# Patient Record
Sex: FEMALE | Race: WHITE | NOT HISPANIC OR LATINO | Employment: OTHER | URBAN - METROPOLITAN AREA
[De-identification: names, ages, dates, MRNs, and addresses within clinical notes are randomized per-mention and may not be internally consistent; named-entity substitution may affect disease eponyms.]

---

## 2017-01-01 DIAGNOSIS — I10 ESSENTIAL HYPERTENSION: ICD-10-CM

## 2017-01-03 RX ORDER — LISINOPRIL 5 MG/1
TABLET ORAL
Qty: 30 TABLET | Refills: 5 | Status: SHIPPED | OUTPATIENT
Start: 2017-01-03 | End: 2017-06-26 | Stop reason: SDUPTHER

## 2017-03-06 RX ORDER — METOPROLOL TARTRATE 50 MG/1
TABLET, FILM COATED ORAL
Qty: 180 TABLET | Refills: 1 | Status: SHIPPED | OUTPATIENT
Start: 2017-03-06 | End: 2017-08-29 | Stop reason: SDUPTHER

## 2017-06-26 ENCOUNTER — OFFICE VISIT (OUTPATIENT)
Dept: CARDIOLOGY | Facility: CLINIC | Age: 62
End: 2017-06-26

## 2017-06-26 VITALS
HEIGHT: 66 IN | HEART RATE: 65 BPM | DIASTOLIC BLOOD PRESSURE: 82 MMHG | BODY MASS INDEX: 33.62 KG/M2 | SYSTOLIC BLOOD PRESSURE: 118 MMHG | WEIGHT: 209.2 LBS

## 2017-06-26 DIAGNOSIS — I10 ESSENTIAL HYPERTENSION: ICD-10-CM

## 2017-06-26 DIAGNOSIS — I48.0 PAF (PAROXYSMAL ATRIAL FIBRILLATION) (HCC): Primary | ICD-10-CM

## 2017-06-26 PROCEDURE — 93000 ELECTROCARDIOGRAM COMPLETE: CPT | Performed by: INTERNAL MEDICINE

## 2017-06-26 PROCEDURE — 99213 OFFICE O/P EST LOW 20 MIN: CPT | Performed by: INTERNAL MEDICINE

## 2017-06-26 RX ORDER — LISINOPRIL 5 MG/1
5 TABLET ORAL DAILY
Qty: 90 TABLET | Refills: 3 | Status: SHIPPED | OUTPATIENT
Start: 2017-06-26 | End: 2018-03-26 | Stop reason: SDUPTHER

## 2017-06-26 NOTE — PROGRESS NOTES
"Date of Office Visit: 2017  Encounter Provider: Dylan Stanton MD  Place of Service: [facility] CARDIOLOGY  Patient Name: Luiza Melendrez  :1955    Chief Complaint   Patient presents with   • Atrial Fibrillation     6 MONTHS FOLLOW UP   :     HPI: Luiza Melendrez is a 62 y.o. female who presents today to follow up.     She presented with atrial flutter in 2013 and underwent successful ablation. In 2016, she went to the ED with palpiations and was found to be in atrial fibrillation and quickly converted to sinus rhythm. She was seen in our office soon thereafter and had an echocardiogram which was normal except for mild-moderate left atrial dilation. She was placed on metoprolol, and was not initially anticoagulated due to her own concerns as well as the fact that she needed a colonscopy. Afterwards, she was started on rivaroxaban.       She has infrequent palpitations; they usually occur when she lies down at night. They're isolated and sound like PVC's or PAC's. She has not had sustained palpitations like her previous atrial fibrillation.  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       Past Medical History:   Diagnosis Date   • Atrial flutter 2013    s/p ablation 2013   • Bladder polyp    • Edema of lower extremity     left; chronic   • GERD (gastroesophageal reflux disease)    • Hypertension    • Loose, teeth     \"due to shorter than normal root length\"   • Obstructive sleep apnea     - wearing CPAP machine    • PAF (paroxysmal atrial fibrillation)    • Pain of cervical spine     INTERMITTENT   • PONV (postoperative nausea and vomiting)     PRIOR TO DIPROVAN USE       Past Surgical History:   Procedure Laterality Date   • BUNIONECTOMY     • CARDIAC ABLATION  2013    for atrial flutter   • COLONOSCOPY N/A 10/18/2016    Procedure: COLONOSCOPY " "into cecum and terminal ileum.  with polypectomy;  Surgeon: Eusebio Cartagena MD;  Location: [facility] ENDOSCOPY;  Service:    • HYSTERECTOMY  2012    WITH ANTERIOR REPAIR   • REPLACEMENT TOTAL KNEE Right 05/14/2013   • REPLACEMENT TOTAL KNEE Left 11/05/2013   • TUBAL ABDOMINAL LIGATION         Social History     Social History   • Marital status:      Spouse name: N/A   • Number of children: N/A   • Years of education: N/A     Occupational History   • Not on file.     Social History Main Topics   • Smoking status: Never Smoker   • Smokeless tobacco: Never Used      Comment: caffeine use / tea/ 6 CUPS DAILY    • Alcohol use Yes      Comment: glass of wine with dinner sometimes & bourbon on the weekends   • Drug use: No   • Sexual activity: Defer     Other Topics Concern   • Not on file     Social History Narrative       Family History   Problem Relation Age of Onset   • Atrial fibrillation Mother    • Cancer Father      bladder    • Valvular heart disease Father      aortic valve replacement       Review of Systems   Cardiovascular: Positive for leg swelling.   All other systems reviewed and are negative.      Allergies   Allergen Reactions   • Erythromycin Nausea And Vomiting   • Hydrocodone-Acetaminophen Nausea And Vomiting   • Morphine And Related Nausea And Vomiting   • Oxycodone-Acetaminophen Nausea And Vomiting         Current Outpatient Prescriptions:   •  lisinopril (PRINIVIL,ZESTRIL) 5 MG tablet, Take 1 tablet by mouth Daily., Disp: 90 tablet, Rfl: 3  •  metoprolol tartrate (LOPRESSOR) 50 MG tablet, TAKE ONE TABLET BY MOUTH TWICE A DAY, Disp: 180 tablet, Rfl: 1  •  pantoprazole (PROTONIX) 40 MG EC tablet, Take 40 mg by mouth Daily., Disp: , Rfl:   •  rivaroxaban (XARELTO) 20 MG tablet, Take 1 tablet by mouth Daily., Disp: 30 tablet, Rfl: 11     Objective:     Vitals:    06/26/17 1030   BP: 118/82   Pulse: 65   Weight: 209 lb 3.2 oz (94.9 kg)   Height: 66\" (167.6 cm)     Body mass index is 33.77 " kg/(m^2).    Physical Exam   Constitutional: She is oriented to person, place, and time.   Obese   HENT:   Head: Normocephalic.   Nose: Nose normal.   Mouth/Throat: Oropharynx is clear and moist.   Eyes: Conjunctivae and EOM are normal. Pupils are equal, round, and reactive to light.   Neck: Normal range of motion.   Cannot assess for JVD due to habitus   Cardiovascular: Normal rate, regular rhythm, normal heart sounds and intact distal pulses.    No murmur heard.  Pulmonary/Chest: Effort normal.   Abdominal: Soft.   Obesity limits abdominal exam   Musculoskeletal: Normal range of motion. She exhibits edema (mild nonpitting pedal swelling).   Neurological: She is alert and oriented to person, place, and time. No cranial nerve deficit.   Skin: Skin is warm and dry. No rash noted.   Psychiatric: She has a normal mood and affect. Her behavior is normal. Judgment and thought content normal.   Vitals reviewed.        ECG 12 Lead  Date/Time: 6/26/2017 12:55 PM  Performed by: GEOFFREY MUÑOZ  Authorized by: GEOFFREY MUÑOZ   Comparison: compared with previous ECG   Similar to previous ECG  Rhythm: sinus rhythm  Conduction: conduction normal  ST Segments: ST segments normal  T Waves: T waves normal  QRS axis: normal  Other: no other findings  Clinical impression: normal ECG              Assessment:       Diagnosis Plan   1. PAF (paroxysmal atrial fibrillation)     2. Essential hypertension  lisinopril (PRINIVIL,ZESTRIL) 5 MG tablet          Plan:       1.  Atrial Fibrillation and Atrial Flutter  Assessment  • The patient has paroxysmal atrial fibrillation  • This is non-valvular in etiology  • The patient's CHADS2-VASc score is 2  • A ZCO6EM4-SPIb score of 2 or more is considered a high risk for a thromboembolic event  • Rivaroxaban prescribed    Plan  • Attempt to maintain sinus rhythm  • Continue rivaroxaban for antithrombotic therapy, bleeding issues discussed  • Continue beta blocker for rhythm control  • If she develops  worsening symptoms in the future, I will likely use a IC AAD (will need a stress test first if it comes to that).    2.  Her BP is within goal.     Sincerely,       Dylan Stanton MD

## 2017-08-29 RX ORDER — METOPROLOL TARTRATE 50 MG/1
TABLET, FILM COATED ORAL
Qty: 180 TABLET | Refills: 1 | Status: SHIPPED | OUTPATIENT
Start: 2017-08-29 | End: 2018-03-02 | Stop reason: SDUPTHER

## 2017-12-06 RX ORDER — RIVAROXABAN 20 MG/1
TABLET, FILM COATED ORAL
Qty: 30 TABLET | Refills: 4 | Status: SHIPPED | OUTPATIENT
Start: 2017-12-06 | End: 2018-03-26 | Stop reason: SDUPTHER

## 2018-03-02 RX ORDER — METOPROLOL TARTRATE 50 MG/1
TABLET, FILM COATED ORAL
Qty: 180 TABLET | Refills: 0 | Status: SHIPPED | OUTPATIENT
Start: 2018-03-02 | End: 2018-03-26 | Stop reason: SDUPTHER

## 2018-03-26 ENCOUNTER — OFFICE VISIT (OUTPATIENT)
Dept: CARDIOLOGY | Facility: CLINIC | Age: 63
End: 2018-03-26

## 2018-03-26 VITALS
DIASTOLIC BLOOD PRESSURE: 74 MMHG | BODY MASS INDEX: 34.2 KG/M2 | SYSTOLIC BLOOD PRESSURE: 126 MMHG | HEIGHT: 66 IN | WEIGHT: 212.8 LBS

## 2018-03-26 DIAGNOSIS — I10 ESSENTIAL HYPERTENSION: ICD-10-CM

## 2018-03-26 DIAGNOSIS — I48.0 PAF (PAROXYSMAL ATRIAL FIBRILLATION) (HCC): Primary | ICD-10-CM

## 2018-03-26 PROCEDURE — 99213 OFFICE O/P EST LOW 20 MIN: CPT | Performed by: INTERNAL MEDICINE

## 2018-03-26 PROCEDURE — 93000 ELECTROCARDIOGRAM COMPLETE: CPT | Performed by: INTERNAL MEDICINE

## 2018-03-26 RX ORDER — METOPROLOL TARTRATE 50 MG/1
50 TABLET, FILM COATED ORAL 2 TIMES DAILY
Qty: 180 TABLET | Refills: 3 | Status: SHIPPED | OUTPATIENT
Start: 2018-03-26 | End: 2019-04-02 | Stop reason: SDUPTHER

## 2018-03-26 RX ORDER — LISINOPRIL 5 MG/1
5 TABLET ORAL DAILY
Qty: 90 TABLET | Refills: 3 | Status: SHIPPED | OUTPATIENT
Start: 2018-03-26 | End: 2019-04-19 | Stop reason: SDUPTHER

## 2018-03-26 RX ORDER — RANITIDINE 150 MG/1
150 TABLET ORAL DAILY
COMMUNITY
End: 2020-05-20

## 2018-03-26 NOTE — PROGRESS NOTES
"Date of Office Visit: 2018  Encounter Provider: Dylan Stanton MD  Place of Service: [facility] CARDIOLOGY  Patient Name: Luiza Melendrez  :1955    Chief Complaint   Patient presents with   • Atrial Fibrillation     follow up    :     HPI: Luiza Melendrez is a 63 y.o. female who presents today to follow up.     She presented with atrial flutter in 2013 and underwent successful ablation. In 2016, she went to the ED with palpiations and was found to be in atrial fibrillation and quickly converted to sinus rhythm. She was seen in our office soon thereafter and had an echocardiogram which was normal except for mild-moderate left atrial dilation. She was placed on metoprolol, and was not initially anticoagulated due to her own concerns as well as the fact that she needed a colonscopy. Afterwards, she was started on rivaroxaban.       She has infrequent palpitations; they usually occur when she lies down at night. They're isolated and sound like PVC's or PAC's. She has not had sustained palpitations like her previous atrial fibrillation.  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       Past Medical History:   Diagnosis Date   • Atrial flutter 2013    s/p ablation 2013   • Bladder polyp    • Edema of lower extremity     left; chronic   • GERD (gastroesophageal reflux disease)    • Hypertension    • Loose, teeth     \"due to shorter than normal root length\"   • Obstructive sleep apnea     - wearing CPAP machine    • PAF (paroxysmal atrial fibrillation)    • Pain of cervical spine     INTERMITTENT   • PONV (postoperative nausea and vomiting)     PRIOR TO DIPROVAN USE       Past Surgical History:   Procedure Laterality Date   • BUNIONECTOMY     • CARDIAC ABLATION  2013    for atrial flutter   • COLONOSCOPY N/A 10/18/2016    Procedure: COLONOSCOPY into " cecum and terminal ileum.  with polypectomy;  Surgeon: Eusebio Cartagena MD;  Location: [facility] ENDOSCOPY;  Service:    • HYSTERECTOMY  2012    WITH ANTERIOR REPAIR   • REPLACEMENT TOTAL KNEE Right 05/14/2013   • REPLACEMENT TOTAL KNEE Left 11/05/2013   • TUBAL ABDOMINAL LIGATION         Social History     Social History   • Marital status:      Spouse name: N/A   • Number of children: N/A   • Years of education: N/A     Occupational History   • Not on file.     Social History Main Topics   • Smoking status: Never Smoker   • Smokeless tobacco: Never Used      Comment: caffeine use / tea/ 6 CUPS DAILY    • Alcohol use Yes      Comment: glass of wine with dinner sometimes & bourbon on the weekends   • Drug use: No   • Sexual activity: Defer     Other Topics Concern   • Not on file     Social History Narrative   • No narrative on file       Family History   Problem Relation Age of Onset   • Atrial fibrillation Mother    • Cancer Father      bladder    • Valvular heart disease Father      aortic valve replacement       Review of Systems   Constitution: Negative for malaise/fatigue.   Cardiovascular: Positive for dyspnea on exertion and leg swelling. Negative for chest pain and palpitations.   Respiratory: Positive for snoring.    Neurological: Negative for light-headedness and numbness.   All other systems reviewed and are negative.      Allergies   Allergen Reactions   • Erythromycin Nausea And Vomiting   • Hydrocodone-Acetaminophen Nausea And Vomiting   • Morphine And Related Nausea And Vomiting   • Oxycodone-Acetaminophen Nausea And Vomiting         Current Outpatient Prescriptions:   •  Cholecalciferol (VITAMIN D PO), Take  by mouth Every Morning., Disp: , Rfl:   •  lisinopril (PRINIVIL,ZESTRIL) 5 MG tablet, Take 1 tablet by mouth Daily., Disp: 90 tablet, Rfl: 3  •  metoprolol tartrate (LOPRESSOR) 50 MG tablet, TAKE ONE TABLET BY MOUTH TWICE A DAY, Disp: 180 tablet, Rfl: 0  •  raNITIdine (ZANTAC) 150 MG tablet,  "Take 150 mg by mouth Daily., Disp: , Rfl:   •  XARELTO 20 MG tablet, TAKE ONE TABLET BY MOUTH DAILY, Disp: 30 tablet, Rfl: 4     Objective:     Vitals:    03/26/18 1043   BP: 126/74   BP Location: Right arm   Patient Position: Sitting   Weight: 96.5 kg (212 lb 12.8 oz)   Height: 167.6 cm (66\")     Body mass index is 34.35 kg/m².    Physical Exam   Constitutional: She is oriented to person, place, and time.   Obese   HENT:   Head: Normocephalic.   Nose: Nose normal.   Mouth/Throat: Oropharynx is clear and moist.   Eyes: Conjunctivae and EOM are normal. Pupils are equal, round, and reactive to light.   Neck: Normal range of motion.   Cannot assess for JVD due to habitus   Cardiovascular: Normal rate, regular rhythm, normal heart sounds and intact distal pulses.    No murmur heard.  Pulmonary/Chest: Effort normal.   Abdominal: Soft.   Obesity limits abdominal exam   Musculoskeletal: Normal range of motion. She exhibits edema (mild nonpitting pedal swelling).   Neurological: She is alert and oriented to person, place, and time. No cranial nerve deficit.   Skin: Skin is warm and dry. No rash noted.   Psychiatric: She has a normal mood and affect. Her behavior is normal. Judgment and thought content normal.   Vitals reviewed.        ECG 12 Lead  Date/Time: 3/26/2018 10:56 AM  Performed by: GEOFFREY MUÑOZ  Authorized by: GEOFFREY MUÑOZ   Comparison: compared with previous ECG   Similar to previous ECG  Rhythm: sinus rhythm  Conduction: conduction normal  ST Segments: ST segments normal  T Waves: T waves normal  QRS axis: normal  Other: no other findings  Clinical impression: normal ECG              Assessment:       Diagnosis Plan   1. PAF (paroxysmal atrial fibrillation)     2. Essential hypertension  lisinopril (PRINIVIL,ZESTRIL) 5 MG tablet          Plan:       1.  Atrial Fibrillation and Atrial Flutter  Assessment  • The patient has paroxysmal atrial fibrillation  • This is non-valvular in etiology  • The patient's " CHADS2-VASc score is 2  • A CJB9GY4-JCIe score of 2 or more is considered a high risk for a thromboembolic event  • Rivaroxaban prescribed    Plan  • Attempt to maintain sinus rhythm  • Continue rivaroxaban for antithrombotic therapy, bleeding issues discussed  • Continue beta blocker for rhythm control  • If she develops worsening symptoms in the future, I will likely use a IC AAD (will need a stress test first if it comes to that).    2.  Her BP is within goal.     Sincerely,       Dylan Stanton MD

## 2018-09-17 ENCOUNTER — TELEPHONE (OUTPATIENT)
Dept: CARDIOLOGY | Facility: CLINIC | Age: 63
End: 2018-09-17

## 2018-09-17 NOTE — TELEPHONE ENCOUNTER
Pt called stating that she saw her internist Dr. Campbell for shortness of air.  Dr. Campbell wanted pt to call you to see if you thought pt may need an echo done    Pt was last seen on 3/26/18 and is due to see you again in March of 2019. Last echo was done on 10/10/16

## 2018-09-19 NOTE — TELEPHONE ENCOUNTER
Pt returned call left message stating she is in a training class today until 4, will call back after

## 2018-09-21 NOTE — TELEPHONE ENCOUNTER
Spoke with pt she is scheduled to see Dr. Stanton in March 2019. She feels that she can just wait until then to get her echo done if Dr. Stanton feels one is needed at that time. I did inform her that if she started to feel worse to always give us a call back

## 2018-10-23 ENCOUNTER — TELEPHONE (OUTPATIENT)
Dept: CARDIOLOGY | Facility: CLINIC | Age: 63
End: 2018-10-23

## 2018-10-23 NOTE — TELEPHONE ENCOUNTER
Pt called and reported that over the weekend, she had 2 episodes of arrhthymias.  One lasted 1 hr on Thursday and the other on Sunday that lasted 1.5 hr.  She denies any lightheaded, soa, cp or soa during the episodes.  On Sunday she did have some alcohol around 7pm and the episode started at 9pm.  She had taken all of there medication for the day (Xarelto 20 mg, Lopressor 50 mg bid and took and extra dose of Lisinopril 5 mg)    She also states that she has noticed an increase in soa when she is singing and more frequent times when she has to take deep breaths, which is new for her.      Pt is due to follow up in March.

## 2019-04-03 RX ORDER — METOPROLOL TARTRATE 50 MG/1
TABLET, FILM COATED ORAL
Qty: 60 TABLET | Refills: 1 | Status: SHIPPED | OUTPATIENT
Start: 2019-04-03 | End: 2019-04-19 | Stop reason: SDUPTHER

## 2019-04-22 ENCOUNTER — OFFICE VISIT (OUTPATIENT)
Dept: CARDIOLOGY | Facility: CLINIC | Age: 64
End: 2019-04-22

## 2019-04-22 VITALS
SYSTOLIC BLOOD PRESSURE: 128 MMHG | WEIGHT: 214.8 LBS | BODY MASS INDEX: 34.52 KG/M2 | DIASTOLIC BLOOD PRESSURE: 80 MMHG | HEIGHT: 66 IN | HEART RATE: 65 BPM

## 2019-04-22 DIAGNOSIS — I48.0 PAF (PAROXYSMAL ATRIAL FIBRILLATION) (HCC): Primary | ICD-10-CM

## 2019-04-22 DIAGNOSIS — R06.02 SHORTNESS OF BREATH: ICD-10-CM

## 2019-04-22 DIAGNOSIS — I10 ESSENTIAL HYPERTENSION: ICD-10-CM

## 2019-04-22 PROCEDURE — 99214 OFFICE O/P EST MOD 30 MIN: CPT | Performed by: INTERNAL MEDICINE

## 2019-04-22 PROCEDURE — 93000 ELECTROCARDIOGRAM COMPLETE: CPT | Performed by: INTERNAL MEDICINE

## 2019-04-22 RX ORDER — METOPROLOL TARTRATE 50 MG/1
50 TABLET, FILM COATED ORAL 2 TIMES DAILY
Qty: 180 TABLET | Refills: 3 | Status: SHIPPED | OUTPATIENT
Start: 2019-04-22 | End: 2020-05-24

## 2019-04-22 RX ORDER — LISINOPRIL 5 MG/1
5 TABLET ORAL DAILY
Qty: 90 TABLET | Refills: 3 | Status: SHIPPED | OUTPATIENT
Start: 2019-04-22 | End: 2020-06-03

## 2019-04-22 NOTE — PROGRESS NOTES
"Date of Office Visit: 2019  Encounter Provider: Dylan Stanton MD  Place of Service: [facility] CARDIOLOGY  Patient Name: Luiza Melendrez  :1955    Chief Complaint   Patient presents with   • Atrial Fibrillation   :     HPI: Luiza Melendrez is a 64 y.o. female who presents today to follow up.     She presented with atrial flutter in 2013 and underwent successful ablation. In 2016, she went to the ED with palpiations and was found to be in atrial fibrillation and quickly converted to sinus rhythm. She was seen in our office soon thereafter and had an echocardiogram which was normal except for mild-moderate left atrial dilation. She was placed on metoprolol, and was not initially anticoagulated due to her own concerns as well as the fact that she needed a colonscopy. Afterwards, she was started on rivaroxaban.       She had one episode of AF after [place] which resolved after two hours. She denies chest discomfort, syncope, orthopnea, or worsening edema (she has chronic left pedal edema due to prior venous stripping). She was lightheaded once after repeatedly bending over and then standing up while cleaning a closet.  She gets short of breath when she sings but not when she's at the gym and this worries her.     Past Medical History:   Diagnosis Date   • Atrial flutter (CMS/HCC) 2013    s/p ablation 2013   • Bladder polyp    • Edema of lower extremity     left; chronic   • GERD (gastroesophageal reflux disease)    • Hypertension    • Loose, teeth     \"due to shorter than normal root length\"   • Obstructive sleep apnea     - wearing CPAP machine    • PAF (paroxysmal atrial fibrillation) (CMS/HCC)    • Pain of cervical spine     INTERMITTENT   • PONV (postoperative nausea and vomiting)     PRIOR TO DIPROVAN USE       Past Surgical History:   Procedure Laterality Date   • BUNIONECTOMY     • CARDIAC ABLATION  2013    for atrial flutter   • COLONOSCOPY " N/A 10/18/2016    Procedure: COLONOSCOPY into cecum and terminal ileum.  with polypectomy;  Surgeon: Eusebio Cartagena MD;  Location: [facility] ENDOSCOPY;  Service:    • HYSTERECTOMY  2012    WITH ANTERIOR REPAIR   • REPLACEMENT TOTAL KNEE Right 05/14/2013   • REPLACEMENT TOTAL KNEE Left 11/05/2013   • TUBAL ABDOMINAL LIGATION         Social History     Socioeconomic History   • Marital status:      Spouse name: Not on file   • Number of children: Not on file   • Years of education: Not on file   • Highest education level: Not on file   Tobacco Use   • Smoking status: Never Smoker   • Smokeless tobacco: Never Used   • Tobacco comment: caffeine use / tea/ 6 CUPS DAILY    Substance and Sexual Activity   • Alcohol use: Yes     Comment: glass of wine with dinner sometimes & bourbon on the weekends   • Drug use: No   • Sexual activity: Defer       Family History   Problem Relation Age of Onset   • Atrial fibrillation Mother    • Cancer Father         bladder    • Valvular heart disease Father         aortic valve replacement       Review of Systems   Constitution: Positive for weight gain. Negative for malaise/fatigue.   Cardiovascular: Negative for chest pain and palpitations.   Respiratory: Positive for shortness of breath and snoring.    Musculoskeletal: Positive for joint pain.   Neurological: Negative for light-headedness and numbness.   All other systems reviewed and are negative.      Allergies   Allergen Reactions   • Erythromycin Nausea And Vomiting   • Hydrocodone-Acetaminophen Nausea And Vomiting   • Morphine And Related Nausea And Vomiting   • Oxycodone-Acetaminophen Nausea And Vomiting         Current Outpatient Medications:   •  Cholecalciferol (VITAMIN D PO), Take  by mouth Every Morning., Disp: , Rfl:   •  raNITIdine (ZANTAC) 150 MG tablet, Take 150 mg by mouth Daily., Disp: , Rfl:   •  lisinopril (PRINIVIL,ZESTRIL) 5 MG tablet, Take 1 tablet by mouth Daily., Disp: 90 tablet, Rfl: 3  •  metoprolol  "tartrate (LOPRESSOR) 50 MG tablet, Take 1 tablet by mouth 2 (Two) Times a Day., Disp: 180 tablet, Rfl: 3  •  rivaroxaban (XARELTO) 20 MG tablet, Take 1 tablet by mouth Daily., Disp: 90 tablet, Rfl: 3     Objective:     Vitals:    04/22/19 1013   BP: 128/80   BP Location: Left arm   Pulse: 65   Weight: 97.4 kg (214 lb 12.8 oz)   Height: 167.6 cm (66\")     Body mass index is 34.67 kg/m².    Physical Exam   Constitutional: She is oriented to person, place, and time.   Obese   HENT:   Head: Normocephalic.   Nose: Nose normal.   Mouth/Throat: Oropharynx is clear and moist.   Eyes: Conjunctivae and EOM are normal. Pupils are equal, round, and reactive to light.   Neck: Normal range of motion.   Cannot assess for JVD due to habitus   Cardiovascular: Normal rate, regular rhythm, normal heart sounds and intact distal pulses.   No murmur heard.  Pulmonary/Chest: Effort normal.   Abdominal: Soft.   Obesity limits abdominal exam   Musculoskeletal: Normal range of motion. She exhibits edema (mild nonpitting pedal swelling).   Neurological: She is alert and oriented to person, place, and time. No cranial nerve deficit.   Skin: Skin is warm and dry. No rash noted.   Psychiatric: She has a normal mood and affect. Her behavior is normal. Judgment and thought content normal.   Vitals reviewed.        ECG 12 Lead  Date/Time: 4/22/2019 11:17 AM  Performed by: Dylan Stanton MD  Authorized by: Dylan Stanton MD   Comparison: compared with previous ECG   Similar to previous ECG  Rhythm: sinus rhythm  Conduction: conduction normal  ST Segments: ST segments normal  T Waves: T waves normal  Other: no other findings    Clinical impression: normal ECG              Assessment:       Diagnosis Plan   1. PAF (paroxysmal atrial fibrillation) (CMS/HCC)  Adult Stress Echo W/ Cont or Stress Agent if Necessary Per Protocol   2. Essential hypertension     3. Shortness of breath  Adult Stress Echo W/ Cont or Stress Agent if Necessary Per Protocol "          Plan:       1.  Atrial Fibrillation and Atrial Flutter  Assessment  • The patient has paroxysmal atrial fibrillation  • This is non-valvular in etiology  • The patient's CHADS2-VASc score is 3  • A VGR9OC6-THDf score of 2 or more is considered a high risk for a thromboembolic event  • Rivaroxaban prescribed    Plan  • Attempt to maintain sinus rhythm  • Continue rivaroxaban for antithrombotic therapy, bleeding issues discussed  • Continue beta blocker for rhythm control  • If she develops worsening symptoms in the future, I will likely use a IC AAD.    2.  Her BP is within goal.     3.  I suspect her dyspnea with singing (which is while seated/driving) is due to central obesity but I can't exclude a cardiac cause (although it seems less likely due to the fact that she's fine at the gym).  I'll get a stress echo.    Sincerely,       Dylan Stanton MD

## 2019-05-07 ENCOUNTER — HOSPITAL ENCOUNTER (OUTPATIENT)
Dept: CARDIOLOGY | Facility: HOSPITAL | Age: 64
Discharge: HOME | End: 2019-05-07
Admitting: INTERNAL MEDICINE

## 2019-05-07 VITALS
WEIGHT: 214 LBS | BODY MASS INDEX: 34.39 KG/M2 | HEART RATE: 82 BPM | SYSTOLIC BLOOD PRESSURE: 160 MMHG | HEIGHT: 66 IN | DIASTOLIC BLOOD PRESSURE: 82 MMHG

## 2019-05-07 DIAGNOSIS — R06.02 SHORTNESS OF BREATH: ICD-10-CM

## 2019-05-07 DIAGNOSIS — I48.0 PAF (PAROXYSMAL ATRIAL FIBRILLATION) (HCC): ICD-10-CM

## 2019-05-07 LAB
BH CV ECHO MEAS - ACS: 2 CM
BH CV ECHO MEAS - AO MAX PG: 7.2 MMHG
BH CV ECHO MEAS - AO ROOT AREA (BSA CORRECTED): 1.6
BH CV ECHO MEAS - AO ROOT AREA: 9 CM^2
BH CV ECHO MEAS - AO ROOT DIAM: 3.4 CM
BH CV ECHO MEAS - AO V2 MAX: 134.4 CM/SEC
BH CV ECHO MEAS - BSA(HAYCOCK): 2.2 M^2
BH CV ECHO MEAS - BSA: 2.1 M^2
BH CV ECHO MEAS - BZI_BMI: 34.5 KILOGRAMS/M^2
BH CV ECHO MEAS - BZI_METRIC_HEIGHT: 167.6 CM
BH CV ECHO MEAS - BZI_METRIC_WEIGHT: 97.1 KG
BH CV ECHO MEAS - EDV(MOD-SP2): 86 ML
BH CV ECHO MEAS - EDV(MOD-SP4): 75 ML
BH CV ECHO MEAS - EDV(TEICH): 134.7 ML
BH CV ECHO MEAS - EF(CUBED): 83.7 %
BH CV ECHO MEAS - EF(MOD-BP): 65 %
BH CV ECHO MEAS - EF(MOD-SP2): 65.1 %
BH CV ECHO MEAS - EF(MOD-SP4): 78.7 %
BH CV ECHO MEAS - EF(TEICH): 76.3 %
BH CV ECHO MEAS - ESV(MOD-SP2): 30 ML
BH CV ECHO MEAS - ESV(MOD-SP4): 16 ML
BH CV ECHO MEAS - ESV(TEICH): 31.9 ML
BH CV ECHO MEAS - FS: 45.4 %
BH CV ECHO MEAS - IVS/LVPW: 1
BH CV ECHO MEAS - IVSD: 0.98 CM
BH CV ECHO MEAS - LAT PEAK E' VEL: 9 CM/SEC
BH CV ECHO MEAS - LV DIASTOLIC VOL/BSA (35-75): 36.4 ML/M^2
BH CV ECHO MEAS - LV MASS(C)D: 193.5 GRAMS
BH CV ECHO MEAS - LV MASS(C)DI: 94 GRAMS/M^2
BH CV ECHO MEAS - LV SYSTOLIC VOL/BSA (12-30): 7.8 ML/M^2
BH CV ECHO MEAS - LVIDD: 5.3 CM
BH CV ECHO MEAS - LVIDS: 2.9 CM
BH CV ECHO MEAS - LVLD AP2: 7.1 CM
BH CV ECHO MEAS - LVLD AP4: 6.6 CM
BH CV ECHO MEAS - LVLS AP2: 6.1 CM
BH CV ECHO MEAS - LVLS AP4: 5.6 CM
BH CV ECHO MEAS - LVPWD: 0.98 CM
BH CV ECHO MEAS - MED PEAK E' VEL: 8 CM/SEC
BH CV ECHO MEAS - MR MAX PG: 102.7 MMHG
BH CV ECHO MEAS - MR MAX VEL: 506.8 CM/SEC
BH CV ECHO MEAS - MV A DUR: 0.13 SEC
BH CV ECHO MEAS - MV A MAX VEL: 75.8 CM/SEC
BH CV ECHO MEAS - MV DEC SLOPE: 465.4 CM/SEC^2
BH CV ECHO MEAS - MV DEC TIME: 0.18 SEC
BH CV ECHO MEAS - MV E MAX VEL: 92.5 CM/SEC
BH CV ECHO MEAS - MV E/A: 1.2
BH CV ECHO MEAS - MV P1/2T MAX VEL: 94.3 CM/SEC
BH CV ECHO MEAS - MV P1/2T: 59.3 MSEC
BH CV ECHO MEAS - MVA P1/2T LCG: 2.3 CM^2
BH CV ECHO MEAS - MVA(P1/2T): 3.7 CM^2
BH CV ECHO MEAS - PULM A REVS DUR: 0.12 SEC
BH CV ECHO MEAS - PULM A REVS VEL: 27.4 CM/SEC
BH CV ECHO MEAS - PULM DIAS VEL: 35.1 CM/SEC
BH CV ECHO MEAS - PULM S/D: 1.3
BH CV ECHO MEAS - PULM SYS VEL: 45 CM/SEC
BH CV ECHO MEAS - SI(CUBED): 60.2 ML/M^2
BH CV ECHO MEAS - SI(MOD-SP2): 27.2 ML/M^2
BH CV ECHO MEAS - SI(MOD-SP4): 28.7 ML/M^2
BH CV ECHO MEAS - SI(TEICH): 49.9 ML/M^2
BH CV ECHO MEAS - SV(CUBED): 123.8 ML
BH CV ECHO MEAS - SV(MOD-SP2): 56 ML
BH CV ECHO MEAS - SV(MOD-SP4): 59 ML
BH CV ECHO MEAS - SV(TEICH): 102.8 ML
BH CV ECHO MEAS - TAPSE (>1.6): 3 CM2
BH CV ECHO MEAS - TR MAX VEL: 270.1 CM/SEC
BH CV ECHO MEASUREMENTS AVERAGE E/E' RATIO: 10.88
BH CV STRESS BP STAGE 1: NORMAL
BH CV STRESS BP STAGE 2: NORMAL
BH CV STRESS DURATION MIN STAGE 1: 3
BH CV STRESS DURATION MIN STAGE 2: 3
BH CV STRESS DURATION MIN STAGE 3: 1
BH CV STRESS DURATION SEC STAGE 1: 0
BH CV STRESS DURATION SEC STAGE 2: 0
BH CV STRESS DURATION SEC STAGE 3: 30
BH CV STRESS ECHO POST STRESS EJECTION FRACTION EF: 75 %
BH CV STRESS GRADE STAGE 1: 10
BH CV STRESS GRADE STAGE 2: 12
BH CV STRESS GRADE STAGE 3: 14
BH CV STRESS HR STAGE 1: 124
BH CV STRESS HR STAGE 2: 130
BH CV STRESS HR STAGE 3: 141
BH CV STRESS METS STAGE 1: 5
BH CV STRESS METS STAGE 2: 7.5
BH CV STRESS METS STAGE 3: 10
BH CV STRESS PROTOCOL 1: NORMAL
BH CV STRESS SPEED STAGE 1: 1.7
BH CV STRESS SPEED STAGE 2: 2.5
BH CV STRESS SPEED STAGE 3: 3.4
BH CV STRESS STAGE 1: 1
BH CV STRESS STAGE 2: 2
BH CV STRESS STAGE 3: 3
BH CV XLRA - RV BASE: 2.8 CM
BH CV XLRA - TDI S': 11 CM/SEC
LEFT ATRIUM VOLUME INDEX: 30 ML/M2
MAXIMAL PREDICTED HEART RATE: 155 BPM
PERCENT MAX PREDICTED HR: 90.97 %
STRESS BASELINE BP: NORMAL MMHG
STRESS BASELINE HR: 82 BPM
STRESS PERCENT HR: 107 %
STRESS POST ESTIMATED WORKLOAD: 9 METS
STRESS POST EXERCISE DUR MIN: 7 MIN
STRESS POST EXERCISE DUR SEC: 30 SEC
STRESS POST PEAK BP: NORMAL MMHG
STRESS POST PEAK HR: 141 BPM
STRESS TARGET HR: 132 BPM

## 2019-05-07 PROCEDURE — 93320 DOPPLER ECHO COMPLETE: CPT | Performed by: INTERNAL MEDICINE

## 2019-05-07 PROCEDURE — 93350 STRESS TTE ONLY: CPT | Performed by: INTERNAL MEDICINE

## 2019-05-07 PROCEDURE — 93320 DOPPLER ECHO COMPLETE: CPT

## 2019-05-07 PROCEDURE — 93016 CV STRESS TEST SUPVJ ONLY: CPT | Performed by: INTERNAL MEDICINE

## 2019-05-07 PROCEDURE — 93325 DOPPLER ECHO COLOR FLOW MAPG: CPT

## 2019-05-07 PROCEDURE — 93350 STRESS TTE ONLY: CPT

## 2019-05-07 PROCEDURE — 93018 CV STRESS TEST I&R ONLY: CPT | Performed by: INTERNAL MEDICINE

## 2019-05-07 PROCEDURE — 93017 CV STRESS TEST TRACING ONLY: CPT

## 2019-05-07 PROCEDURE — 93325 DOPPLER ECHO COLOR FLOW MAPG: CPT | Performed by: INTERNAL MEDICINE

## 2020-05-04 RX ORDER — RIVAROXABAN 20 MG/1
TABLET, FILM COATED ORAL
Qty: 90 TABLET | Refills: 0 | Status: SHIPPED | OUTPATIENT
Start: 2020-05-04 | End: 2020-05-05 | Stop reason: SDUPTHER

## 2020-05-20 ENCOUNTER — OFFICE VISIT (OUTPATIENT)
Dept: CARDIOLOGY | Facility: CLINIC | Age: 65
End: 2020-05-20

## 2020-05-20 VITALS — HEIGHT: 66 IN | BODY MASS INDEX: 35.03 KG/M2 | WEIGHT: 218 LBS

## 2020-05-20 DIAGNOSIS — I48.0 PAF (PAROXYSMAL ATRIAL FIBRILLATION) (HCC): Primary | ICD-10-CM

## 2020-05-20 DIAGNOSIS — I10 ESSENTIAL HYPERTENSION: ICD-10-CM

## 2020-05-20 PROCEDURE — 99442 PR PHYS/QHP TELEPHONE EVALUATION 11-20 MIN: CPT | Performed by: INTERNAL MEDICINE

## 2020-05-20 RX ORDER — FAMOTIDINE 20 MG/1
20 TABLET, FILM COATED ORAL DAILY
COMMUNITY

## 2020-05-20 NOTE — PROGRESS NOTES
palps yes several episodes at night  SOA DURAN  Edema yes legs  Lightheaded no  Chest pain yes with eurhythmia episodes  Fatigue no    No vitals patient does not have a BP Cuff

## 2020-05-20 NOTE — PROGRESS NOTES
"Date of Office Visit: 2020  Encounter Provider: Dylan Stanton MD  Place of Service: [facility] CARDIOLOGY  Patient Name: Luiza Melendrez  :1955    Chief Complaint   Patient presents with   • Atrial Fibrillation     1 year follow up   :     HPI: Luiza Melendrez is a 65 y.o. female who presents today to follow up by phone.     She presented with atrial flutter in 2013 and underwent successful ablation. In 2016, she went to the ED with palpitations and was found to be in atrial fibrillation and quickly converted to sinus rhythm. She was seen in our office soon thereafter and had an echocardiogram which was normal except for mild-moderate left atrial dilation. She was placed on metoprolol, and was not initially anticoagulated due to her own concerns as well as the fact that she needed a colonscopy. Afterwards, she was started on rivaroxaban.     In May 2019, she reported intermittent palpitations and some dyspnea; a stress echo was normal.    Since then, she has had two episodes of palpitations.  One woke her from sleep; her pulse was erratic and intermittently fast.  It lasted a couple of hours and she went back to sleep and woke up in rhythm.  She had another episode in the evening and it lasted four hours.  She took an extra dose of metoprolol each time.  She denies chest pain, syncope, or lightheadedness.  She denies worsening edema (she has chronic left pedal edema due to prior venous stripping).     Past Medical History:   Diagnosis Date   • Atrial flutter (CMS/HCC) 2013    s/p ablation 2013   • Bladder polyp    • Edema of lower extremity     left; chronic   • GERD (gastroesophageal reflux disease)    • Hypertension    • Loose, teeth     \"due to shorter than normal root length\"   • Obstructive sleep apnea     - wearing CPAP machine    • PAF (paroxysmal atrial fibrillation) (CMS/[facility])    • Pain of cervical spine     INTERMITTENT   • PONV " (postoperative nausea and vomiting)     PRIOR TO DIPROVAN USE       Past Surgical History:   Procedure Laterality Date   • BUNIONECTOMY     • CARDIAC ABLATION  12/2013    for atrial flutter   • COLONOSCOPY N/A 10/18/2016    Procedure: COLONOSCOPY into cecum and terminal ileum.  with polypectomy;  Surgeon: Eusebio Cartagena MD;  Location: [facility] ENDOSCOPY;  Service:    • HYSTERECTOMY  2012    WITH ANTERIOR REPAIR   • REPLACEMENT TOTAL KNEE Right 05/14/2013   • REPLACEMENT TOTAL KNEE Left 11/05/2013   • TUBAL ABDOMINAL LIGATION         Social History     Socioeconomic History   • Marital status:      Spouse name: Not on file   • Number of children: Not on file   • Years of education: Not on file   • Highest education level: Not on file   Tobacco Use   • Smoking status: Never Smoker   • Smokeless tobacco: Never Used   • Tobacco comment: caffeine use / tea/ 6 CUPS DAILY    Substance and Sexual Activity   • Alcohol use: Yes     Alcohol/week: 2.0 standard drinks     Types: 1 Glasses of wine, 1 Shots of liquor per week     Comment: glass of wine with dinner sometimes & bourbon on the weekends   • Drug use: No   • Sexual activity: Defer       Family History   Problem Relation Age of Onset   • Atrial fibrillation Mother    • Cancer Father         bladder    • Valvular heart disease Father         aortic valve replacement       Review of Systems   Constitution: Positive for weight gain. Negative for malaise/fatigue.   Cardiovascular: Positive for chest pain, leg swelling and palpitations.   Respiratory: Positive for shortness of breath and snoring.    Musculoskeletal: Positive for joint pain.   Neurological: Negative for light-headedness and numbness.   All other systems reviewed and are negative.      Allergies   Allergen Reactions   • Erythromycin Nausea And Vomiting   • Hydrocodone-Acetaminophen Nausea And Vomiting   • Morphine And Related Nausea And Vomiting   • Oxycodone-Acetaminophen Nausea And Vomiting  "        Current Outpatient Medications:   •  Cholecalciferol (VITAMIN D PO), Take 1 tablet by mouth Every Morning., Disp: , Rfl:   •  famotidine (PEPCID) 20 MG tablet, Take 20 mg by mouth 2 (Two) Times a Day., Disp: , Rfl:   •  lisinopril (PRINIVIL,ZESTRIL) 5 MG tablet, Take 1 tablet by mouth Daily., Disp: 90 tablet, Rfl: 3  •  metoprolol tartrate (LOPRESSOR) 50 MG tablet, Take 1 tablet by mouth 2 (Two) Times a Day., Disp: 180 tablet, Rfl: 3  •  rivaroxaban (Xarelto) 20 MG tablet, Take 1 tablet by mouth Daily., Disp: 90 tablet, Rfl: 0     Objective:     Vitals:    05/20/20 1255   Weight: 98.9 kg (218 lb)   Height: 167.6 cm (66\")     Body mass index is 35.19 kg/m².    Physical Exam   Constitutional: She is oriented to person, place, and time.   Neurological: She is alert and oriented to person, place, and time.   Psychiatric: She has a normal mood and affect. Her behavior is normal. Judgment and thought content normal.       Procedures      Assessment:       Diagnosis Plan   1. PAF (paroxysmal atrial fibrillation) (CMS/[facility])     2. Essential hypertension            Plan:       1.  Atrial Fibrillation and Atrial Flutter  Assessment  • The patient has paroxysmal atrial fibrillation  • This is non-valvular in etiology  • The patient's CHADS2-VASc score is 3  • A [id number] score of 2 or more is considered a high risk for a thromboembolic event  • Rivaroxaban prescribed    Plan  • Attempt to maintain sinus rhythm  • Continue rivaroxaban for antithrombotic therapy, bleeding issues discussed  • Continue beta blocker for rhythm control  • Currently her symptoms are relatively stable.  If she develops worsening symptoms in the future, I will likely use a IC AAD.  For now, her paroxysms are holding in a stable pattern and she would like to keep things the same.  She will continue to use a PRN dose of metoprolol when she has them.      Subjective - Objective  • The average duration of atrial fibrillation episodes is <48 " "hours      2.  She can't check her BP at home.  In early March, she said the nurse at Dr Johnnie King's office said her blood pressure was \"perfect.\"    This patient has consented to a telehealth visit via phone. The visit was scheduled as a phone visit to comply with patient safety concerns in accordance with CDC recommendations.  All vitals recorded within this visit are reported by the patient.  I spent  15 minutes in total including but not limited to the 10 minutes spent in direct conversation with this patient.     Sincerely,       Dylan Stanton MD                  "

## 2020-05-24 RX ORDER — METOPROLOL TARTRATE 50 MG/1
TABLET, FILM COATED ORAL
Qty: 180 TABLET | Refills: 3 | Status: SHIPPED | OUTPATIENT
Start: 2020-05-24 | End: 2021-05-28

## 2020-06-03 RX ORDER — LISINOPRIL 5 MG/1
TABLET ORAL
Qty: 90 TABLET | Refills: 3 | Status: SHIPPED | OUTPATIENT
Start: 2020-06-03 | End: 2021-06-07

## 2020-07-28 RX ORDER — RIVAROXABAN 20 MG/1
TABLET, FILM COATED ORAL
Qty: 90 TABLET | Refills: 3 | Status: SHIPPED | OUTPATIENT
Start: 2020-07-28 | End: 2020-11-11 | Stop reason: SDUPTHER

## 2020-09-24 PROBLEM — E55.9 VITAMIN D DEFICIENCY: Status: ACTIVE | Noted: 2020-09-24

## 2020-09-24 PROBLEM — I10 BENIGN ESSENTIAL HYPERTENSION: Status: ACTIVE | Noted: 2020-09-24

## 2020-09-24 PROBLEM — Z85.51 HISTORY OF BLADDER CANCER: Status: ACTIVE | Noted: 2020-09-24

## 2020-09-24 PROBLEM — K21.9 GASTROESOPHAGEAL REFLUX DISEASE WITHOUT ESOPHAGITIS: Status: ACTIVE | Noted: 2020-09-24

## 2020-09-25 DIAGNOSIS — E55.9 VITAMIN D DEFICIENCY: ICD-10-CM

## 2020-09-25 DIAGNOSIS — I48.0 PAF (PAROXYSMAL ATRIAL FIBRILLATION) (HCC): ICD-10-CM

## 2020-09-25 DIAGNOSIS — I10 BENIGN ESSENTIAL HYPERTENSION: Primary | ICD-10-CM

## 2020-09-29 LAB
25(OH)D3+25(OH)D2 SERPL-MCNC: 34.9 NG/ML (ref 30–100)
ALBUMIN SERPL-MCNC: 4 G/DL (ref 3.8–4.8)
ALBUMIN/GLOB SERPL: 1.7 {RATIO} (ref 1.2–2.2)
ALP SERPL-CCNC: 54 IU/L (ref 39–117)
ALT SERPL-CCNC: 23 IU/L (ref 0–32)
AST SERPL-CCNC: 15 IU/L (ref 0–40)
BASOPHILS # BLD AUTO: 0 X10E3/UL (ref 0–0.2)
BASOPHILS NFR BLD AUTO: 0 %
BILIRUB SERPL-MCNC: 0.5 MG/DL (ref 0–1.2)
BUN SERPL-MCNC: 19 MG/DL (ref 8–27)
BUN/CREAT SERPL: 19 (ref 12–28)
CALCIUM SERPL-MCNC: 9.3 MG/DL (ref 8.7–10.3)
CHLORIDE SERPL-SCNC: 104 MMOL/L (ref 96–106)
CHOLEST SERPL-MCNC: 229 MG/DL (ref 100–199)
CHOLEST/HDLC SERPL: 6 RATIO (ref 0–4.4)
CO2 SERPL-SCNC: 25 MMOL/L (ref 20–29)
CREAT SERPL-MCNC: 1 MG/DL (ref 0.57–1)
EOSINOPHIL # BLD AUTO: 0.2 X10E3/UL (ref 0–0.4)
EOSINOPHIL NFR BLD AUTO: 4 %
ERYTHROCYTE [DISTWIDTH] IN BLOOD BY AUTOMATED COUNT: 13.3 % (ref 11.7–15.4)
GLOBULIN SER CALC-MCNC: 2.3 G/DL (ref 1.5–4.5)
GLUCOSE SERPL-MCNC: 107 MG/DL (ref 65–99)
HCT VFR BLD AUTO: 37.9 % (ref 34–46.6)
HDLC SERPL-MCNC: 38 MG/DL
HGB BLD-MCNC: 12.9 G/DL (ref 11.1–15.9)
IMM GRANULOCYTES # BLD AUTO: 0 X10E3/UL (ref 0–0.1)
IMM GRANULOCYTES NFR BLD AUTO: 0 %
LDLC SERPL CALC-MCNC: 140 MG/DL (ref 0–99)
LYMPHOCYTES # BLD AUTO: 1.7 X10E3/UL (ref 0.7–3.1)
LYMPHOCYTES NFR BLD AUTO: 31 %
MCH RBC QN AUTO: 30.9 PG (ref 26.6–33)
MCHC RBC AUTO-ENTMCNC: 34 G/DL (ref 31.5–35.7)
MCV RBC AUTO: 91 FL (ref 79–97)
MONOCYTES # BLD AUTO: 0.6 X10E3/UL (ref 0.1–0.9)
MONOCYTES NFR BLD AUTO: 10 %
NEUTROPHILS # BLD AUTO: 3.1 X10E3/UL (ref 1.4–7)
NEUTROPHILS NFR BLD AUTO: 55 %
PLATELET # BLD AUTO: 276 X10E3/UL (ref 150–450)
POTASSIUM SERPL-SCNC: 4.7 MMOL/L (ref 3.5–5.2)
PROT SERPL-MCNC: 6.3 G/DL (ref 6–8.5)
RBC # BLD AUTO: 4.18 X10E6/UL (ref 3.77–5.28)
SODIUM SERPL-SCNC: 143 MMOL/L (ref 134–144)
TRIGL SERPL-MCNC: 279 MG/DL (ref 0–149)
VLDLC SERPL CALC-MCNC: 51 MG/DL (ref 5–40)
WBC # BLD AUTO: 5.7 X10E3/UL (ref 3.4–10.8)

## 2020-10-05 ENCOUNTER — OFFICE VISIT (OUTPATIENT)
Dept: INTERNAL MEDICINE | Facility: CLINIC | Age: 65
End: 2020-10-05

## 2020-10-05 VITALS
RESPIRATION RATE: 16 BRPM | SYSTOLIC BLOOD PRESSURE: 128 MMHG | HEART RATE: 67 BPM | WEIGHT: 226 LBS | BODY MASS INDEX: 36.32 KG/M2 | TEMPERATURE: 98.9 F | DIASTOLIC BLOOD PRESSURE: 88 MMHG | HEIGHT: 66 IN | OXYGEN SATURATION: 98 %

## 2020-10-05 DIAGNOSIS — I48.0 PAF (PAROXYSMAL ATRIAL FIBRILLATION) (HCC): ICD-10-CM

## 2020-10-05 DIAGNOSIS — E55.9 VITAMIN D DEFICIENCY: ICD-10-CM

## 2020-10-05 DIAGNOSIS — I10 BENIGN ESSENTIAL HYPERTENSION: Primary | ICD-10-CM

## 2020-10-05 DIAGNOSIS — T78.40XA ALLERGY, INITIAL ENCOUNTER: ICD-10-CM

## 2020-10-05 DIAGNOSIS — K21.9 GASTROESOPHAGEAL REFLUX DISEASE WITHOUT ESOPHAGITIS: ICD-10-CM

## 2020-10-05 PROCEDURE — 99214 OFFICE O/P EST MOD 30 MIN: CPT | Performed by: INTERNAL MEDICINE

## 2020-10-05 RX ORDER — SODIUM FLUORIDE 6 MG/ML
PASTE, DENTIFRICE DENTAL SEE ADMIN INSTRUCTIONS
COMMUNITY
Start: 2020-09-09 | End: 2021-07-02

## 2020-10-05 RX ORDER — AZELASTINE HYDROCHLORIDE 0.5 MG/ML
1 SOLUTION/ DROPS OPHTHALMIC 2 TIMES DAILY
Qty: 1 EACH | Refills: 12 | Status: SHIPPED | OUTPATIENT
Start: 2020-10-05 | End: 2021-01-27

## 2020-11-11 NOTE — TELEPHONE ENCOUNTER
Pt called to request a 30 day supply for Xarelto .  The 90 day supply is $365.  I offered samples for the pt until the beginning of the year, but pt would like to wait.  Pt will be contacting her insurance for details.  Pt would preferred to not try Warfarin just yet, but understands that it may be an option.      Please see pending rx.

## 2021-01-27 ENCOUNTER — OFFICE VISIT (OUTPATIENT)
Dept: FAMILY MEDICINE CLINIC | Facility: CLINIC | Age: 66
End: 2021-01-27

## 2021-01-27 VITALS
RESPIRATION RATE: 15 BRPM | TEMPERATURE: 97.9 F | DIASTOLIC BLOOD PRESSURE: 70 MMHG | BODY MASS INDEX: 36.98 KG/M2 | OXYGEN SATURATION: 98 % | HEART RATE: 78 BPM | WEIGHT: 229 LBS | SYSTOLIC BLOOD PRESSURE: 110 MMHG

## 2021-01-27 DIAGNOSIS — I48.0 PAF (PAROXYSMAL ATRIAL FIBRILLATION) (HCC): Primary | ICD-10-CM

## 2021-01-27 DIAGNOSIS — J34.89 RHINORRHEA: ICD-10-CM

## 2021-01-27 PROCEDURE — 99214 OFFICE O/P EST MOD 30 MIN: CPT | Performed by: FAMILY MEDICINE

## 2021-01-27 RX ORDER — FLUTICASONE PROPIONATE 50 MCG
2 SPRAY, SUSPENSION (ML) NASAL DAILY
Qty: 18.2 ML | Refills: 5 | Status: SHIPPED | OUTPATIENT
Start: 2021-01-27 | End: 2021-07-02

## 2021-01-27 NOTE — PROGRESS NOTES
Chief Complaint  Establish Care    Subjective    History of Present Illness {CC  Problem List  Visit  Diagnosis   Encounters  Notes  Medications  Labs  Result Review Imaging  Media :23}     Luiza Melendrez presents to [facility] for Establish Care.  History of Present Illness     Here today to establish care. Previous physician left practice. Considers herself fairly healthy overall, no real concerns today. Does need a refill of her rivaroxaban which she is taking for chronic paroxysmal atrial fibrillation. Has been on for several years with good adherence and tolerance. Has a history of atrial flutter which was successfully ablated but was told that she would likely have lifetime atrial fibrillation. Can oftentimes feel when her heart rate is irregular but never has any dizziness or postural hypotension symptoms.    Has noted a near constant nose drip for the past year or so. Is unsure of the exact etiology. Does enjoy spicy foods. Denies any congestion, itchy eyes, or any other allergic symptoms. Has never tried a nasal steroid for this.    Objective     Vital Signs:   /70   Pulse 78   Temp 97.9 °F (36.6 °C)   Resp 15   Wt 104 kg (229 lb)   SpO2 98%   BMI 36.98 kg/m²   Physical Exam  Vitals signs and nursing note reviewed.   Constitutional:       General: She is not in acute distress.     Appearance: Normal appearance. She is not ill-appearing.   HENT:      Right Ear: Hearing, tympanic membrane, ear canal and external ear normal.      Left Ear: Hearing, tympanic membrane, ear canal and external ear normal.      Nose: Rhinorrhea present. No nasal tenderness, mucosal edema or congestion. Rhinorrhea is clear.   Cardiovascular:      Rate and Rhythm: Normal rate.      Pulses: Normal pulses.      Heart sounds: Normal heart sounds. No murmur.   Pulmonary:      Effort: Pulmonary effort is normal. No respiratory distress.      Breath sounds: Normal breath sounds. No  meghana.   Neurological:      Mental Status: She is alert and oriented to person, place, and time. Mental status is at baseline.   Psychiatric:         Mood and Affect: Mood normal.         Behavior: Behavior normal.          Result Review  Data Reviewed:{ Labs  Result Review  Imaging  Med Tab  Media :23}                   Assessment and Plan {CC Problem List  Visit Diagnosis  ROS  Review (Popup)  Health Maintenance  Quality  BestPractice  Medications  SmartSets  SnapShot Encounters  Media :23}   Diagnoses and all orders for this visit:    1. PAF (paroxysmal atrial fibrillation) (CMS/[facility]) (Primary)  -     rivaroxaban (Xarelto) 20 MG tablet; Take 1 tablet by mouth Daily.  Dispense: 90 tablet; Refill: 1    2. Rhinorrhea  -     fluticasone (Flonase) 50 MCG/ACT nasal spray; 2 sprays into the nostril(s) as directed by provider Daily.  Dispense: 18.2 mL; Refill: 5    Refills as above. Unsure the etiology of rhinitis but we discussed possibly trying to avoid spicy foods and other possible triggers. We will try some nasal steroid to see if that offers any relief.    Continue regimen as prescribed. Follow-up as below for Medicare wellness visit. Encouraged communication via "[employer]t in the meantime.      Follow Up {Instructions Charge Capture  Follow-up Communications :23}     Patient was given instructions and counseling regarding her condition or for health maintenance advice. Please see specific information pulled into the AVS (placed there by myself) if appropriate.    Return in about 2 months (around 3/27/2021) for Medicare Wellness.      MARILYN Foster MD

## 2021-03-24 ENCOUNTER — OFFICE VISIT (OUTPATIENT)
Dept: FAMILY MEDICINE CLINIC | Facility: CLINIC | Age: 66
End: 2021-03-24

## 2021-03-24 VITALS
BODY MASS INDEX: 35.85 KG/M2 | DIASTOLIC BLOOD PRESSURE: 88 MMHG | HEART RATE: 80 BPM | WEIGHT: 222 LBS | TEMPERATURE: 98.2 F | RESPIRATION RATE: 15 BRPM | OXYGEN SATURATION: 98 % | SYSTOLIC BLOOD PRESSURE: 120 MMHG

## 2021-03-24 DIAGNOSIS — Z13.6 ENCOUNTER FOR LIPID SCREENING FOR CARDIOVASCULAR DISEASE: ICD-10-CM

## 2021-03-24 DIAGNOSIS — Z23 NEED FOR VACCINATION: ICD-10-CM

## 2021-03-24 DIAGNOSIS — Z13.220 ENCOUNTER FOR LIPID SCREENING FOR CARDIOVASCULAR DISEASE: ICD-10-CM

## 2021-03-24 DIAGNOSIS — Z13.1 SCREENING FOR DIABETES MELLITUS: ICD-10-CM

## 2021-03-24 DIAGNOSIS — Z00.00 ROUTINE HEALTH MAINTENANCE: Primary | ICD-10-CM

## 2021-03-24 DIAGNOSIS — E66.01 CLASS 2 SEVERE OBESITY DUE TO EXCESS CALORIES WITH SERIOUS COMORBIDITY AND BODY MASS INDEX (BMI) OF 35.0 TO 35.9 IN ADULT (HCC): ICD-10-CM

## 2021-03-24 DIAGNOSIS — Z11.59 ENCOUNTER FOR HEPATITIS C SCREENING TEST FOR LOW RISK PATIENT: ICD-10-CM

## 2021-03-24 PROBLEM — E66.812 CLASS 2 SEVERE OBESITY DUE TO EXCESS CALORIES WITH SERIOUS COMORBIDITY AND BODY MASS INDEX (BMI) OF 35.0 TO 35.9 IN ADULT: Status: ACTIVE | Noted: 2021-03-24

## 2021-03-24 PROCEDURE — G0439 PPPS, SUBSEQ VISIT: HCPCS | Performed by: FAMILY MEDICINE

## 2021-03-24 RX ORDER — TRIAMCINOLONE ACETONIDE 1 MG/G
CREAM TOPICAL
Status: ON HOLD | COMMUNITY
Start: 2021-03-10 | End: 2022-10-05

## 2021-03-24 RX ORDER — FLUTICASONE PROPIONATE 50 MCG
2 SPRAY, SUSPENSION (ML) NASAL DAILY
COMMUNITY
Start: 2021-01-27 | End: 2021-03-24

## 2021-03-24 RX ORDER — ZOSTER VACCINE RECOMBINANT, ADJUVANTED 50 MCG/0.5
0.5 KIT INTRAMUSCULAR ONCE
Qty: 0.5 ML | Refills: 0 | Status: SHIPPED | OUTPATIENT
Start: 2021-03-24 | End: 2021-03-24

## 2021-03-24 RX ORDER — NYSTATIN 100000 U/G
CREAM TOPICAL
COMMUNITY
Start: 2021-03-10 | End: 2021-03-24

## 2021-03-24 NOTE — PATIENT INSTRUCTIONS
Exercising to Stay Healthy  To become healthy and stay healthy, it is recommended that you do moderate-intensity and vigorous-intensity exercise. You can tell that you are exercising at a moderate intensity if your heart starts beating faster and you start breathing faster but can still hold a conversation. You can tell that you are exercising at a vigorous intensity if you are breathing much harder and faster and cannot hold a conversation while exercising.  Exercising regularly is important. It has many health benefits, such as:  · Improving overall fitness, flexibility, and endurance.  · Increasing bone density.  · Helping with weight control.  · Decreasing body fat.  · Increasing muscle strength.  · Reducing stress and tension.  · Improving overall health.  How often should I exercise?  Choose an activity that you enjoy, and set realistic goals. Your health care provider can help you make an activity plan that works for you.  Exercise regularly as told by your health care provider. This may include:  · Doing strength training two times a week, such as:  ? Lifting weights.  ? Using resistance bands.  ? Push-ups.  ? Sit-ups.  ? Yoga.  · Doing a certain intensity of exercise for a given amount of time. Choose from these options:  ? A total of 150 minutes of moderate-intensity exercise every week.  ? A total of 75 minutes of vigorous-intensity exercise every week.  ? A mix of moderate-intensity and vigorous-intensity exercise every week.  Children, pregnant women, people who have not exercised regularly, people who are overweight, and older adults may need to talk with a health care provider about what activities are safe to do. If you have a medical condition, be sure to talk with your health care provider before you start a new exercise program.  What are some exercise ideas?  Moderate-intensity exercise ideas include:  · Walking 1 mile (1.6 km) in about 15  minutes.  · Biking.  · Hiking.  · Golfing.  · Dancing.  · Water aerobics.  Vigorous-intensity exercise ideas include:  · Walking 4.5 miles (7.2 km) or more in about 1 hour.  · Jogging or running 5 miles (8 km) in about 1 hour.  · Biking 10 miles (16.1 km) or more in about 1 hour.  · Lap swimming.  · Roller-skating or in-line skating.  · Cross-country skiing.  · Vigorous competitive sports, such as football, basketball, and soccer.  · Jumping rope.  · Aerobic dancing.  What are some everyday activities that can help me to get exercise?  · Yard work, such as:  ? Pushing a .  ? Raking and bagging leaves.  · Washing your car.  · Pushing a stroller.  · Shoveling snow.  · Gardening.  · Washing windows or floors.  How can I be more active in my day-to-day activities?  · Use stairs instead of an elevator.  · Take a walk during your lunch break.  · If you drive, park your car farther away from your work or school.  · If you take public transportation, get off one stop early and walk the rest of the way.  · Stand up or walk around during all of your indoor phone calls.  · Get up, stretch, and walk around every 30 minutes throughout the day.  · Enjoy exercise with a friend. Support to continue exercising will help you keep a regular routine of activity.  What guidelines can I follow while exercising?  · Before you start a new exercise program, talk with your health care provider.  · Do not exercise so much that you hurt yourself, feel dizzy, or get very short of breath.  · Wear comfortable clothes and wear shoes with good support.  · Drink plenty of water while you exercise to prevent dehydration or heat stroke.  · Work out until your breathing and your heartbeat get faster.  Where to find more information  · [place] Department of Health and Human Services: www.hhs.gov  · Centers for Disease Control and Prevention (CDC): www.cdc.gov  Summary  · Exercising regularly is important. It will improve your overall fitness,  flexibility, and endurance.  · Regular exercise also will improve your overall health. It can help you control your weight, reduce stress, and improve your bone density.  · Do not exercise so much that you hurt yourself, feel dizzy, or get very short of breath.  · Before you start a new exercise program, talk with your health care provider.  This information is not intended to replace advice given to you by your health care provider. Make sure you discuss any questions you have with your health care provider.  Document Revised: 11/30/2018 Document Reviewed: 11/08/2018  Elsevier Patient Education © 2021 [employer]

## 2021-03-24 NOTE — PROGRESS NOTES
The ABCs of the Annual Wellness Visit  Subsequent Medicare Wellness Visit    Chief Complaint   Patient presents with   • Atrial Fibrillation     follow up       Subjective   History of Present Illness:  Luiza Melendrez is a 66 y.o. female who presents for a Subsequent Medicare Wellness Visit.    Here today for general follow-up and Medicare wellness visit.  Doing well overall, no specific questions or concerns today.  Reports good adherence to her current medical regimen, tolerating well.    Due for some vaccines today.  Will be happy to get them at her pharmacy.    HEALTH RISK ASSESSMENT    Recent Hospitalizations:  No hospitalization(s) within the last year.    Current Medical Providers:  Patient Care Team:  Rufino Foster MD as PCP - General (Family Medicine)    Smoking Status:  Social History     Tobacco Use   Smoking Status Never Smoker   Smokeless Tobacco Never Used   Tobacco Comment    caffeine use / tea/ 6 CUPS DAILY        Alcohol Consumption:  Social History     Substance and Sexual Activity   Alcohol Use Yes   • Alcohol/week: 2.0 standard drinks   • Types: 1 Glasses of wine, 1 Shots of liquor per week    Comment: glass of wine with dinner sometimes & bourbon on the weekends       Depression Screen:   No flowsheet data found.    Fall Risk Screen:  STEADI Fall Risk Assessment has not been completed.    Health Habits and Functional and Cognitive Screening:  No flowsheet data found.      Does the patient have evidence of cognitive impairment? No    Asprin use counseling:Does not need ASA (and currently is not on it)    Age-appropriate Screening Schedule:  Refer to the list below for future screening recommendations based on patient's age, sex and/or medical conditions. Orders for these recommended tests are listed in the plan section. The patient has been provided with a written plan.    Health Maintenance   Topic Date Due   • DXA SCAN  Never done   • ZOSTER VACCINE (2 of 3) 12/27/2017   • TDAP/TD  VACCINES (2 - Td) 11/02/2020   • MAMMOGRAM  03/10/2023   • COLONOSCOPY  10/20/2026   • INFLUENZA VACCINE  Completed   • PAP SMEAR  Discontinued          The following portions of the patient's history were reviewed and updated as appropriate: allergies, current medications, past family history, past medical history, past social history, past surgical history and problem list.    Outpatient Medications Prior to Visit   Medication Sig Dispense Refill   • Cholecalciferol (VITAMIN D PO) Take 1 tablet by mouth Every Morning.     • famotidine (PEPCID) 20 MG tablet Take 20 mg by mouth 2 (Two) Times a Day.     • fluticasone (Flonase) 50 MCG/ACT nasal spray 2 sprays into the nostril(s) as directed by provider Daily. 18.2 mL 5   • lisinopril (PRINIVIL,ZESTRIL) 5 MG tablet TAKE 1 TABLET BY MOUTH EVERY DAY 90 tablet 3   • metoprolol tartrate (LOPRESSOR) 50 MG tablet TAKE 1 TABLET BY MOUTH TWICE A  tablet 3   • PreviDent 5000 Booster Plus 1.1 % paste See Admin Instructions.     • rivaroxaban (Xarelto) 20 MG tablet Take 1 tablet by mouth Daily. 90 tablet 1   • fluticasone (FLONASE) 50 MCG/ACT nasal spray 2 sprays into the nostril(s) as directed by provider Daily.     • triamcinolone (KENALOG) 0.1 % cream APPLY TO AFFECTED AREA TWICE A DAY AS NEEDED     • nystatin (MYCOSTATIN) 513654 UNIT/GM cream APPLY TOPICALLY AS NEEDED FOR RASH TO AFFECTED AREA(S)..       No facility-administered medications prior to visit.       Patient Active Problem List   Diagnosis   • Prolapse of vaginal wall   • Second degree uterine prolapse   • PAF (paroxysmal atrial fibrillation) (CMS/[facility])   • Benign essential hypertension   • Gastroesophageal reflux disease without esophagitis   • Vitamin D deficiency   • History of bladder cancer   • Allergies   • Class 2 severe obesity due to excess calories with serious comorbidity and body mass index (BMI) of 35.0 to 35.9 in adult (CMS/[facility])       Advanced Care Planning:  ACP discussion was held with the  patient during this visit. Patient has an advance directive in EMR which is still valid.     Review of Systems    Compared to one year ago, the patient feels her physical health is the same.  Compared to one year ago, the patient feels her mental health is the same.    Reviewed chart for potential of high risk medication in the elderly: yes  Reviewed chart for potential of harmful drug interactions in the elderly:yes    Objective         Vitals:    03/24/21 0922   BP: 120/88   Pulse: 80   Resp: 15   Temp: 98.2 °F (36.8 °C)   SpO2: 98%   Weight: 101 kg (222 lb)       Body mass index is 35.85 kg/m².  Discussed the patient's BMI with her. The BMI is above average; BMI management plan is completed.    Physical Exam  Vitals and nursing note reviewed.   Constitutional:       General: She is not in acute distress.     Appearance: Normal appearance. She is not ill-appearing.   Cardiovascular:      Rate and Rhythm: Normal rate and regular rhythm.      Pulses: Normal pulses.      Heart sounds: Normal heart sounds. No murmur heard.     Pulmonary:      Effort: Pulmonary effort is normal. No respiratory distress.      Breath sounds: Normal breath sounds. No rales.   Neurological:      Mental Status: She is alert and oriented to person, place, and time. Mental status is at baseline.   Psychiatric:         Mood and Affect: Mood normal.         Behavior: Behavior normal.               Assessment/Plan   Medicare Risks and Personalized Health Plan  CMS Preventative Services Quick Reference  Inactivity/Sedentary  Obesity/Overweight     The above risks/problems have been discussed with the patient.  Pertinent information has been shared with the patient in the After Visit Summary.  Follow up plans and orders are seen below in the Assessment/Plan Section.    Diagnoses and all orders for this visit:    1. Routine health maintenance (Primary)    2. Class 2 severe obesity due to excess calories with serious comorbidity and body mass index  (BMI) of 35.0 to 35.9 in adult (CMS/HCC)  -     Comprehensive Metabolic Panel  -     Lipid Panel    3. Need for vaccination  -     Zoster Vac Recomb Adjuvanted (Shingrix) 50 MCG/0.5ML reconstituted suspension; Inject 0.5 mL into the appropriate muscle as directed by prescriber 1 (One) Time for 1 dose.  Dispense: 0.5 mL; Refill: 0  -     pneumococcal polysaccharide 23-valent (PNEUMOVAX-23) 25 MCG/0.5ML vaccine; Inject 0.5 mL into the appropriate muscle as directed by prescriber 1 (One) Time for 1 dose.  Dispense: 0.5 mL; Refill: 0    4. Encounter for lipid screening for cardiovascular disease  -     Lipid Panel    5. Screening for diabetes mellitus  -     Comprehensive Metabolic Panel    6. Encounter for hepatitis C screening test for low risk patient  -     Hepatitis C Antibody    Orders as above.  I will contact her with lab results as available.  Recommended she go to the pharmacy to get her vaccines at her convenience.    Recommended follow-up as below.  Encouraged communication via [employer] in the meantime.    Follow Up:  Return in about 6 months (around 9/24/2021), or if symptoms worsen or fail to improve.     An After Visit Summary and PPPS were given to the patient.     Prevention counseling performed as below: Healthy exercise.

## 2021-03-25 LAB
ALBUMIN SERPL-MCNC: 4.1 G/DL (ref 3.5–5.2)
ALBUMIN/GLOB SERPL: 1.9 G/DL
ALP SERPL-CCNC: 61 U/L (ref 39–117)
ALT SERPL-CCNC: 16 U/L (ref 1–33)
AST SERPL-CCNC: 9 U/L (ref 1–32)
BILIRUB SERPL-MCNC: 0.5 MG/DL (ref 0–1.2)
BUN SERPL-MCNC: 14 MG/DL (ref 8–23)
BUN/CREAT SERPL: 17.5 (ref 7–25)
CALCIUM SERPL-MCNC: 9.2 MG/DL (ref 8.6–10.5)
CHLORIDE SERPL-SCNC: 107 MMOL/L (ref 98–107)
CHOLEST SERPL-MCNC: 186 MG/DL (ref 0–200)
CO2 SERPL-SCNC: 27.7 MMOL/L (ref 22–29)
CREAT SERPL-MCNC: 0.8 MG/DL (ref 0.57–1)
GLOBULIN SER CALC-MCNC: 2.2 GM/DL
GLUCOSE SERPL-MCNC: 105 MG/DL (ref 65–99)
HCV AB S/CO SERPL IA: <0.1 S/CO RATIO (ref 0–0.9)
HDLC SERPL-MCNC: 38 MG/DL (ref 40–60)
LDLC SERPL CALC-MCNC: 118 MG/DL (ref 0–100)
POTASSIUM SERPL-SCNC: 4.3 MMOL/L (ref 3.5–5.2)
PROT SERPL-MCNC: 6.3 G/DL (ref 6–8.5)
SODIUM SERPL-SCNC: 143 MMOL/L (ref 136–145)
TRIGL SERPL-MCNC: 167 MG/DL (ref 0–150)
VLDLC SERPL CALC-MCNC: 30 MG/DL (ref 5–40)

## 2021-03-26 DIAGNOSIS — E78.2 MIXED HYPERLIPIDEMIA: Primary | ICD-10-CM

## 2021-04-06 ENCOUNTER — CLINICAL SUPPORT (OUTPATIENT)
Dept: FAMILY MEDICINE CLINIC | Facility: CLINIC | Age: 66
End: 2021-04-06

## 2021-04-06 DIAGNOSIS — Z23 NEED FOR VACCINATION: Primary | ICD-10-CM

## 2021-04-06 PROCEDURE — G0009 ADMIN PNEUMOCOCCAL VACCINE: HCPCS | Performed by: FAMILY MEDICINE

## 2021-04-06 PROCEDURE — 90732 PPSV23 VACC 2 YRS+ SUBQ/IM: CPT | Performed by: FAMILY MEDICINE

## 2021-05-28 ENCOUNTER — TELEPHONE (OUTPATIENT)
Dept: CARDIOLOGY | Facility: CLINIC | Age: 66
End: 2021-05-28

## 2021-05-28 ENCOUNTER — CLINICAL SUPPORT (OUTPATIENT)
Dept: CARDIOLOGY | Facility: CLINIC | Age: 66
End: 2021-05-28

## 2021-05-28 VITALS — HEIGHT: 66 IN | BODY MASS INDEX: 35.85 KG/M2

## 2021-05-28 DIAGNOSIS — I48.0 PAF (PAROXYSMAL ATRIAL FIBRILLATION) (HCC): Primary | ICD-10-CM

## 2021-05-28 DIAGNOSIS — R00.2 PALPITATIONS: ICD-10-CM

## 2021-05-28 DIAGNOSIS — R00.2 PALPITATIONS: Primary | ICD-10-CM

## 2021-05-28 PROCEDURE — 93000 ELECTROCARDIOGRAM COMPLETE: CPT | Performed by: INTERNAL MEDICINE

## 2021-05-28 RX ORDER — METOPROLOL TARTRATE 50 MG/1
TABLET, FILM COATED ORAL
Qty: 180 TABLET | Refills: 0 | Status: SHIPPED | OUTPATIENT
Start: 2021-05-28 | End: 2021-07-21 | Stop reason: SDUPTHER

## 2021-05-28 NOTE — TELEPHONE ENCOUNTER
Patient has been added to the holter schedule for today and I have sent a message to pre-cert department to obtain authorization.    Thank you  Allison

## 2021-05-28 NOTE — TELEPHONE ENCOUNTER
"Dr Stanton,  Pt is calling in to let you know that she has noticed and increase in frequency of her arhythmia.  She is reporting an episode last night that lasted about 4 hours.  She stated at that time her heart felt irregular and she could feel her heart \"beating out of her chest\"   She is reporting a heart rate of .  She described the \"thumping\" as \"uncomfortable when she lays down\"  She denies SOA or dizziness.    She also wanted me to pass on that when she goes to bed at night she has to go up 2 flights of stars and she has noticed with this activity will bring on the arhythmia, but not if she is walking.    Cardiac meds  Xarelto 20mg daily  lisinopril 5mg daily  Metoprolol tartrate 50mg BID    Please advise on how to proceed  Thanks  Jo Martinez RN  Triage nurse      "

## 2021-05-28 NOTE — TELEPHONE ENCOUNTER
Pt coming in at 1230 for EKG     please add for a Holter today  Thanks  Jo Martinez RN  Triage nurse

## 2021-05-28 NOTE — PROGRESS NOTES
Procedure     ECG 12 Lead    Date/Time: 5/28/2021 12:26 PM  Performed by: Dylan Stanton MD  Authorized by: Dylan Stanton MD   Comparison: compared with previous ECG   Similar to previous ECG  Rhythm: sinus rhythm  Conduction: conduction normal  ST Segments: ST segments normal  T Waves: T waves normal  QRS axis: normal  Other: no other findings    Clinical impression: normal ECG

## 2021-06-07 RX ORDER — LISINOPRIL 5 MG/1
TABLET ORAL
Qty: 90 TABLET | Refills: 3 | Status: SHIPPED | OUTPATIENT
Start: 2021-06-07 | End: 2022-06-06

## 2021-07-02 ENCOUNTER — OFFICE VISIT (OUTPATIENT)
Dept: FAMILY MEDICINE CLINIC | Facility: CLINIC | Age: 66
End: 2021-07-02

## 2021-07-02 VITALS
DIASTOLIC BLOOD PRESSURE: 80 MMHG | WEIGHT: 224 LBS | HEART RATE: 71 BPM | SYSTOLIC BLOOD PRESSURE: 122 MMHG | OXYGEN SATURATION: 97 % | RESPIRATION RATE: 18 BRPM | HEIGHT: 65 IN | BODY MASS INDEX: 37.32 KG/M2

## 2021-07-02 DIAGNOSIS — R20.0 FINGER NUMBNESS: Primary | ICD-10-CM

## 2021-07-02 PROCEDURE — 99213 OFFICE O/P EST LOW 20 MIN: CPT | Performed by: NURSE PRACTITIONER

## 2021-07-02 NOTE — PROGRESS NOTES
Subjective   Luiza Melendrez is a 66 y.o. female.     History of Present Illness   Patient presents with c/o partial pointer finger numbness that has been ongoing for about 1 month. 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 Patient reports that she has not injured herself. She is not currently taking any medications for this.     The following portions of the patient's history were reviewed and updated as appropriate: allergies, current medications, past family history, past medical history, past social history, past surgical history and problem list.    Review of Systems   Constitutional: Negative for chills, fatigue and fever.   Respiratory: Negative for cough, chest tightness, shortness of breath and wheezing.    Cardiovascular: Negative for chest pain, palpitations and leg swelling.   Musculoskeletal: Positive for neck pain. Negative for arthralgias, back pain, gait problem and joint swelling.   Neurological: Positive for numbness (left index finger). Negative for dizziness and headache.   Hematological: Negative.    Psychiatric/Behavioral: Negative.  Negative for sleep disturbance.       Objective   Physical Exam  Vitals and nursing note reviewed.   Constitutional:       Appearance: She is well-developed.   HENT:      Head: Normocephalic and atraumatic.   Eyes:      Conjunctiva/sclera: Conjunctivae normal.      Pupils: Pupils are equal, round, and reactive to light.   Cardiovascular:      Rate and Rhythm: Normal rate and regular rhythm.      Heart sounds: Normal heart sounds. No murmur heard.     Pulmonary:      Effort: Pulmonary effort is normal.      Breath sounds: Normal breath sounds.   Musculoskeletal:         General: Normal range of motion.      Right shoulder: Normal. Normal range of motion.      Left shoulder: Normal. Normal range of motion.      Right hand: Normal. No tenderness. Normal range of motion. Normal pulse.      Left hand: Normal. No swelling, deformity,  "lacerations or bony tenderness. Normal range of motion. Normal pulse.   Neurological:      Mental Status: She is alert and oriented to person, place, and time.   Psychiatric:         Behavior: Behavior normal.         Thought Content: Thought content normal.         Judgment: Judgment normal.         Vitals:    07/02/21 1001   BP: 122/80   Pulse: 71   Resp: 18   SpO2: 97%     Body mass index is 37.28 kg/m².    Procedures    Assessment/Plan   Problems Addressed this Visit     None      Diagnoses    None.       Patient refused referral to hand surgery. States that she will \"pick her own doctor\".  Patient advised to use wrist brace.  Xray of left hand       Return if symptoms worsen or fail to improve.     Patient Instructions   May use wrist brace at night.  Call with any questions or concerns.      "

## 2021-07-21 ENCOUNTER — OFFICE VISIT (OUTPATIENT)
Dept: CARDIOLOGY | Facility: CLINIC | Age: 66
End: 2021-07-21

## 2021-07-21 VITALS
WEIGHT: 225 LBS | SYSTOLIC BLOOD PRESSURE: 122 MMHG | DIASTOLIC BLOOD PRESSURE: 80 MMHG | HEIGHT: 65 IN | BODY MASS INDEX: 37.49 KG/M2 | HEART RATE: 70 BPM

## 2021-07-21 DIAGNOSIS — I10 ESSENTIAL HYPERTENSION: ICD-10-CM

## 2021-07-21 DIAGNOSIS — I48.0 PAF (PAROXYSMAL ATRIAL FIBRILLATION) (HCC): Primary | ICD-10-CM

## 2021-07-21 PROCEDURE — 93000 ELECTROCARDIOGRAM COMPLETE: CPT | Performed by: INTERNAL MEDICINE

## 2021-07-21 PROCEDURE — 99214 OFFICE O/P EST MOD 30 MIN: CPT | Performed by: INTERNAL MEDICINE

## 2021-07-21 RX ORDER — METOPROLOL TARTRATE 50 MG/1
50 TABLET, FILM COATED ORAL 2 TIMES DAILY
Qty: 180 TABLET | Refills: 3 | Status: SHIPPED | OUTPATIENT
Start: 2021-07-21 | End: 2021-07-21 | Stop reason: SDUPTHER

## 2021-07-21 RX ORDER — METOPROLOL TARTRATE 50 MG/1
TABLET, FILM COATED ORAL
Qty: 270 TABLET | Refills: 3 | Status: SHIPPED | OUTPATIENT
Start: 2021-07-21 | End: 2022-10-05

## 2021-07-21 NOTE — PROGRESS NOTES
Date of Office Visit: 2021  Encounter Provider: Dylan Stanton MD  Place of Service: [facility] CARDIOLOGY  Patient Name: Luiza Melendrez  :1955    Chief Complaint   Patient presents with   • Atrial Fibrillation   :     HPI: Luiza Melendrez is a 66 y.o. female who presents today to follow up. I have reviewed prior notes and there are no changes except for any new updates described below. I have also reviewed any information entered into the medical record by the patient or by ancillary staff.     She presented with atrial flutter in 2013 and underwent successful ablation. In 2016, she went to the ED with palpitations and was found to be in atrial fibrillation and quickly converted to sinus rhythm. She was seen in our office soon thereafter and had an echocardiogram which was normal except for mild-moderate left atrial dilation. She was placed on metoprolol, and was not initially anticoagulated due to her own concerns as well as the fact that she needed a colonscopy. Eventually, she did agree to OAC, and she was started on rivaroxaban.     In May 2019, she reported intermittent palpitations and some dyspnea; a stress echo was normal.    She denies chest pain, syncope, or lightheadedness.  She denies worsening edema (she has chronic left pedal edema due to prior venous stripping). She has mild chronic exertional dyspnea but this is unchanged.     She is having increasing episodes of atrial fibrillation; they are now happening up to 5 times per month. They occur at night, especially, and will sometimes wake her up from sleep. She does have treated sleep apnea.     Past Medical History:   Diagnosis Date   • Atrial fibrillation (CMS/HCC)    • Atrial flutter (CMS/HCC) 2013    s/p ablation 2013   • Bladder cancer (CMS/HCC)    • Bladder polyp    • Edema of lower extremity     left; chronic   • Esophageal reflux disease    • GERD (gastroesophageal reflux  "disease)    • Hypertension    • Loose, teeth     \"due to shorter than normal root length\"   • Obesity, Class I, BMI 30-34.9    • Obstructive sleep apnea     - wearing CPAP machine    • Obstructive sleep apnea, adult    • Osteoarthritis    • PAF (paroxysmal atrial fibrillation) (CMS/HCC)    • Pain of cervical spine     INTERMITTENT   • PONV (postoperative nausea and vomiting)     PRIOR TO DIPROVAN USE   • Post-menopausal    • Pulsatile tinnitus of both ears    • Vitamin D deficiency        Past Surgical History:   Procedure Laterality Date   • BUNIONECTOMY     • CARDIAC ABLATION  12/2013    for atrial flutter   • COLONOSCOPY N/A 10/18/2016    Procedure: COLONOSCOPY into cecum and terminal ileum.  with polypectomy;  Surgeon: Eusebio Cartagena MD;  Location: [facility] ENDOSCOPY;  Service:    • HYSTERECTOMY  2012    WITH ANTERIOR REPAIR   • REPLACEMENT TOTAL KNEE Right 05/14/2013   • REPLACEMENT TOTAL KNEE Left 11/05/2013   • TUBAL ABDOMINAL LIGATION         Social History     Socioeconomic History   • Marital status:      Spouse name: Not on file   • Number of children: Not on file   • Years of education: Not on file   • Highest education level: Not on file   Tobacco Use   • Smoking status: Never Smoker   • Smokeless tobacco: Never Used   • Tobacco comment: caffeine use / tea/ 6 CUPS DAILY    Vaping Use   • Vaping Use: Never used   Substance and Sexual Activity   • Alcohol use: Yes     Alcohol/week: 2.0 standard drinks     Types: 1 Glasses of wine, 1 Shots of liquor per week     Comment: glass of wine with dinner sometimes & bourbon on the weekends   • Drug use: No   • Sexual activity: Defer       Family History   Problem Relation Age of Onset   • Atrial fibrillation Mother    • Cancer Father         bladder    • Valvular heart disease Father         aortic valve replacement       Review of Systems   Constitutional: Positive for weight gain. Negative for malaise/fatigue.   Cardiovascular: Positive for leg swelling " "and palpitations.   Respiratory: Positive for shortness of breath and snoring.    Musculoskeletal: Positive for joint pain.   Neurological: Negative for light-headedness and numbness.   All other systems reviewed and are negative.      Allergies   Allergen Reactions   • Erythromycin Nausea And Vomiting   • Hydrocodone-Acetaminophen Nausea And Vomiting   • Morphine And Related Nausea And Vomiting   • Oxycodone-Acetaminophen Nausea And Vomiting         Current Outpatient Medications:   •  Cholecalciferol (VITAMIN D PO), Take 1 tablet by mouth Every Morning., Disp: , Rfl:   •  famotidine (PEPCID) 20 MG tablet, Take 20 mg by mouth 2 (Two) Times a Day., Disp: , Rfl:   •  lisinopril (PRINIVIL,ZESTRIL) 5 MG tablet, TAKE 1 TABLET BY MOUTH EVERY DAY, Disp: 90 tablet, Rfl: 3  •  rivaroxaban (Xarelto) 20 MG tablet, Take 1 tablet by mouth Daily., Disp: 90 tablet, Rfl: 1  •  triamcinolone (KENALOG) 0.1 % cream, APPLY TO AFFECTED AREA TWICE A DAY AS NEEDED, Disp: , Rfl:   •  metoprolol tartrate (LOPRESSOR) 50 MG tablet, Take 1 tablet by mouth 2 (Two) Times a Day., Disp: 180 tablet, Rfl: 3     Objective:     Vitals:    07/21/21 0939   BP: 122/80   BP Location: Left arm   Pulse: 70   Weight: 102 kg (225 lb)   Height: 165.1 cm (65\")     Body mass index is 37.44 kg/m².    Physical Exam  Vitals reviewed.   Constitutional:       General: She is not in acute distress.     Appearance: She is well-developed. She is obese.   HENT:      Head: Normocephalic.      Nose: Nose normal.      Mouth/Throat:      Comments: masked  Eyes:      Conjunctiva/sclera: Conjunctivae normal.   Neck:      Vascular: No JVD.   Cardiovascular:      Rate and Rhythm: Normal rate and regular rhythm.      Pulses: Normal pulses and intact distal pulses.      Heart sounds: Normal heart sounds. No murmur heard.     Pulmonary:      Effort: Pulmonary effort is normal.      Breath sounds: Normal breath sounds.   Abdominal:      Palpations: Abdomen is soft.      Tenderness: " There is no abdominal tenderness.   Musculoskeletal:         General: Normal range of motion.      Cervical back: Normal range of motion.   Lymphadenopathy:      Cervical: No cervical adenopathy.   Skin:     General: Skin is warm and dry.      Findings: No rash.   Neurological:      General: No focal deficit present.      Mental Status: She is alert and oriented to person, place, and time.      Cranial Nerves: No cranial nerve deficit.   Psychiatric:         Mood and Affect: Mood normal.         Behavior: Behavior normal.         Thought Content: Thought content normal.           ECG 12 Lead    Date/Time: 7/21/2021 9:55 AM  Performed by: Dylan Stanton MD  Authorized by: Dylan Stanton MD   Comparison: compared with previous ECG   Similar to previous ECG  Rhythm: sinus rhythm  Conduction: conduction normal  ST Segments: ST segments normal  T Waves: T waves normal  QRS axis: normal  Other: no other findings    Clinical impression: normal ECG              Assessment:       Diagnosis Plan   1. PAF (paroxysmal atrial fibrillation) (CMS/[facility])  ECG 12 Lead   2. Essential hypertension            Plan:       1.  Atrial Fibrillation and Atrial Flutter  Assessment  • The patient has paroxysmal atrial fibrillation  • This is non-valvular in etiology  • The patient's CHADS2-VASc score is 3  • A [id number] score of 2 or more is considered a high risk for a thromboembolic event  • Rivaroxaban prescribed    Plan  • Attempt to maintain sinus rhythm  • Continue rivaroxaban for antithrombotic therapy, bleeding issues discussed  • Continue beta blocker for rhythm control    Subjective - Objective  • The average duration of atrial fibrillation episodes is <48 hours      Her episodes are increasing in frequency, and especially at night. Her sleep apnea is treated. I will increase her PM dose of metoprolol to 100mg.  If this fails to control her symptoms, I will add flecainide.     2.  Her BP is well controlled.    Sincerely,        Dylan Stanton MD

## 2021-08-05 DIAGNOSIS — I48.0 PAF (PAROXYSMAL ATRIAL FIBRILLATION) (HCC): ICD-10-CM

## 2021-08-05 RX ORDER — RIVAROXABAN 20 MG/1
TABLET, FILM COATED ORAL
Qty: 90 TABLET | Refills: 1 | Status: SHIPPED | OUTPATIENT
Start: 2021-08-05 | End: 2022-02-01

## 2021-08-18 ENCOUNTER — TELEPHONE (OUTPATIENT)
Dept: CARDIOLOGY | Facility: CLINIC | Age: 66
End: 2021-08-18

## 2021-08-18 NOTE — TELEPHONE ENCOUNTER
Good morning Dr. Stanton,      This patient had an appointment with Kaur on the 20th, which she doesn't want to keep. She expressed her frustration with me saying she doesn't ever want to see kaur again, and either wanted to see you or a different APRN from here on out. I put this patient on Danae's schedule for the 31st and wrote Danae to make sure it was okay. But this patient is saying that she doesn't really need this appointment, as she has only one episode since you had changed her medicine. Should I go ahead and keep her on the 31st, cancel the appointment or move appointment to a different day?     Thank you so much,    Odette :)

## 2021-08-18 NOTE — TELEPHONE ENCOUNTER
Good morning Danae,      This patient was supposed to see Kaur on the 20th, and she had called me saying that she wanted to switch aprn's or just see the doctor. She doesn't want to see Kaur at all. So I wanted to ask you to make sure you felt comfortable seeing this patient from here on out. If you need me to further explain the situation please let me know. I will be talking to Dr. Stanton as well to let her know.     Thank you,    Odette :)

## 2021-10-05 ENCOUNTER — TELEPHONE (OUTPATIENT)
Dept: CARDIOLOGY | Facility: CLINIC | Age: 66
End: 2021-10-05

## 2021-10-05 NOTE — TELEPHONE ENCOUNTER
I believe she has it a bit backwards -- sleep apnea causes atrial fibrillation. If he is concerned that her machine isn't working well, I would recommend she follow his recommendations. Please let her know.    Jaison garner

## 2021-10-05 NOTE — TELEPHONE ENCOUNTER
Patient is calling in regards to her sleep apnea and CPAP machine. She states she had been using it religiously as directed however when she saw her Pulmonologist he is wanting her to get a new machine and additional testing, he is blaming her Sleep apnea on her AFIB and cardiac issues. Patient does not feel like her sleep apnea Is caused by her afib and is asking for your opinion. Please advise.

## 2022-02-01 DIAGNOSIS — I48.0 PAF (PAROXYSMAL ATRIAL FIBRILLATION): ICD-10-CM

## 2022-02-01 RX ORDER — RIVAROXABAN 20 MG/1
TABLET, FILM COATED ORAL
Qty: 90 TABLET | Refills: 0 | Status: SHIPPED | OUTPATIENT
Start: 2022-02-01 | End: 2022-05-03

## 2022-02-01 NOTE — TELEPHONE ENCOUNTER
Last office visit: 7/2/21    Next office visit: Not scheduled (overdue since 9/2021)    Last refill: 8/5/21

## 2022-02-10 ENCOUNTER — TELEPHONE (OUTPATIENT)
Dept: FAMILY MEDICINE CLINIC | Facility: CLINIC | Age: 67
End: 2022-02-10

## 2022-02-10 NOTE — TELEPHONE ENCOUNTER
Pt called to schedule OV with Dr. Foster to request updated referral for gastro. Pt states that she was recently in the ER at [facility] and wants to speak with provider about this as well. No notes that I can see in chart regarding this visit. Pt wants to be sure Dr. HERNÁNDEZ will be able to access those records. Please advise.     Pt is scheudled for OV 2/15/22

## 2022-02-15 ENCOUNTER — OFFICE VISIT (OUTPATIENT)
Dept: FAMILY MEDICINE CLINIC | Facility: CLINIC | Age: 67
End: 2022-02-15

## 2022-02-15 VITALS
HEIGHT: 66 IN | BODY MASS INDEX: 36.8 KG/M2 | DIASTOLIC BLOOD PRESSURE: 86 MMHG | SYSTOLIC BLOOD PRESSURE: 120 MMHG | OXYGEN SATURATION: 100 % | WEIGHT: 229 LBS | HEART RATE: 64 BPM

## 2022-02-15 DIAGNOSIS — K80.70 CALCULUS OF GALLBLADDER AND BILE DUCT WITHOUT CHOLECYSTITIS OR OBSTRUCTION: Primary | ICD-10-CM

## 2022-02-15 DIAGNOSIS — G47.30 SLEEP APNEA WITH USE OF CONTINUOUS POSITIVE AIRWAY PRESSURE (CPAP): ICD-10-CM

## 2022-02-15 PROCEDURE — 99214 OFFICE O/P EST MOD 30 MIN: CPT | Performed by: FAMILY MEDICINE

## 2022-02-15 RX ORDER — NYSTATIN 100000 U/G
CREAM TOPICAL
Status: ON HOLD | COMMUNITY
Start: 2021-12-23 | End: 2022-10-05

## 2022-02-15 NOTE — PROGRESS NOTES
"Chief Complaint  Abdominal Pain (need referral )    Subjective    History of Present Illness {CC  Problem List  Visit  Diagnosis   Encounters  Notes  Medications  Labs  Result Review Imaging  Media :23}     Luiza Melendrez presents to [facility] for Abdominal Pain (need referral ).  History of Present Illness     Here today for follow-up as above. Was seen in the emergency room with increasing right upper quadrant pain. Evaluation showed cholelithiasis without cholecystitis. These records were reviewed today. Was told to follow-up with GI as it appears the stones would be amenable to removal with ERCP. She has previously seen Dr. Eusebio Cartagena, hoping for a referral back to him today. No further episodes of right upper quadrant pain. Is watching what she eats very closely.    Has longstanding obstructive sleep apnea on CPAP. Not happy with her previous sleep doctor, hoping for referral to a new one. Thinking she needs to replacement CPAP machine and would prefer to avoid having to do another sleep study.    Objective     Vital Signs:   /86 (BP Location: Left arm, Patient Position: Sitting)   Pulse 64   Ht 167.6 cm (66\")   Wt 104 kg (229 lb)   SpO2 100%   BMI 36.96 kg/m²   Physical Exam  Vitals and nursing note reviewed.   Constitutional:       General: She is not in acute distress.     Appearance: Normal appearance. She is not ill-appearing.   Cardiovascular:      Rate and Rhythm: Normal rate and regular rhythm.      Pulses: Normal pulses.      Heart sounds: Normal heart sounds. No murmur heard.      Pulmonary:      Effort: Pulmonary effort is normal. No respiratory distress.      Breath sounds: Normal breath sounds. No rales.   Neurological:      Mental Status: She is alert and oriented to person, place, and time. Mental status is at baseline.   Psychiatric:         Mood and Affect: Mood normal.         Behavior: Behavior normal.          Result Review  Data " Reviewed:{ Labs  Result Review  Imaging  Med Tab  Media :23}                   Assessment and Plan {CC Problem List  Visit Diagnosis  ROS  Review (Popup)  Health Maintenance  Quality  BestPractice  Medications  SmartSets  SnapShot Encounters  Media :23}   Diagnoses and all orders for this visit:    1. Calculus of gallbladder and bile duct without cholecystitis or obstruction (Primary)  -     Ambulatory Referral to Gastroenterology    2. Sleep apnea with use of continuous positive airway pressure (CPAP)  -     Ambulatory Referral to Sleep Medicine    Referrals as requested. Discussed signs and symptoms that would warrant more urgent evaluation of her cholelithiasis.    Recommended follow-up as below. Encouraged communication via [employer] in the meantime.      Follow Up {Instructions Charge Capture  Follow-up Communications :23}     Patient was given instructions and counseling regarding her condition or for health maintenance advice. Please see specific information pulled into the AVS (placed there by myself) if appropriate.    Return in about 3 months (around 5/15/2022), or if symptoms worsen or fail to improve.      MARILYN Foster MD

## 2022-02-17 ENCOUNTER — TELEPHONE (OUTPATIENT)
Dept: FAMILY MEDICINE CLINIC | Facility: CLINIC | Age: 67
End: 2022-02-17

## 2022-02-17 DIAGNOSIS — K80.70 CALCULUS OF GALLBLADDER AND BILE DUCT WITHOUT CHOLECYSTITIS OR OBSTRUCTION: Primary | ICD-10-CM

## 2022-02-17 DIAGNOSIS — K80.21 CALCULUS OF GALLBLADDER WITH BILIARY OBSTRUCTION BUT WITHOUT CHOLECYSTITIS: Primary | ICD-10-CM

## 2022-02-17 NOTE — TELEPHONE ENCOUNTER
Caller: Luiza Melendrez    Relationship: Self    Best call back number: [phone]    What orders are you requesting (i.e. lab or imaging): ORDER FOR NEW GASTRO    In what timeframe would the patient need to come in: AS SOON AS POSSIBLE    Where will you receive your lab/imaging services: DR [facility]    Additional notes: PATIENT STATED DR AGUAYO DOES NOT DO THE ERCP  INDICATED AND PATIENT IS REQUESTING TO HAVE A REFERRAL SENT TO [facility] [phone] ERCP       PATIENT HAS NOT HAD ANY OTHER ATTACKS SINCE GOING TO THE ER.

## 2022-02-21 ENCOUNTER — OFFICE VISIT (OUTPATIENT)
Dept: CARDIOLOGY | Facility: CLINIC | Age: 67
End: 2022-02-21

## 2022-02-21 VITALS
DIASTOLIC BLOOD PRESSURE: 82 MMHG | SYSTOLIC BLOOD PRESSURE: 136 MMHG | HEIGHT: 66 IN | BODY MASS INDEX: 36.38 KG/M2 | HEART RATE: 70 BPM | WEIGHT: 226.4 LBS

## 2022-02-21 DIAGNOSIS — I48.0 PAF (PAROXYSMAL ATRIAL FIBRILLATION): Primary | ICD-10-CM

## 2022-02-21 DIAGNOSIS — I10 ESSENTIAL HYPERTENSION: ICD-10-CM

## 2022-02-21 PROCEDURE — 99214 OFFICE O/P EST MOD 30 MIN: CPT | Performed by: INTERNAL MEDICINE

## 2022-02-21 NOTE — PROGRESS NOTES
Date of Office Visit: 2022  Encounter Provider: Dylan Stanton MD  Place of Service: [facility] CARDIOLOGY  Patient Name: Luiza Melendrez  :1955    Chief Complaint   Patient presents with   • Atrial Fibrillation   :   HPI: Luiza Melendrez is a 66 y.o. female who presents today to follow up. I have reviewed prior notes and there are no changes except for any new updates described below. I have also reviewed any information entered into the medical record by the patient or by ancillary staff.     She presented with atrial flutter in 2013 and underwent successful ablation. In 2016, she went to the ED with palpitations and was found to be in atrial fibrillation and quickly converted to sinus rhythm. She was seen in our office soon thereafter and had an echocardiogram which was normal except for mild-moderate left atrial dilation. She was placed on metoprolol, and was not initially anticoagulated due to her own concerns as well as the fact that she needed a colonscopy. Eventually, she did agree to OAC, and she was started on rivaroxaban.     In May 2019, she reported intermittent palpitations and some dyspnea; a stress echo was normal.    At our last visit, she reported significantly more frequent episodes of atrial fibrillation. I increased her metoprolol dose.  This did help.  She does still have breakthrough episodes.  These typically occur at night.  She did not really have any in January, but she has had 3 this month.  They resolve on their own.  She is aware that her heart is beating irregularly, but otherwise, there are no associated worrisome symptoms.    Past Medical History:   Diagnosis Date   • Atrial fibrillation (HCC)    • Atrial flutter (HCC) 2013    s/p ablation 2013   • Bladder cancer (HCC)    • Bladder polyp    • Edema of lower extremity     left; chronic   • Esophageal reflux disease    • GERD (gastroesophageal reflux disease)    •  "Hypertension    • Loose, teeth     \"due to shorter than normal root length\"   • Obesity, Class I, BMI 30-34.9    • Obstructive sleep apnea     - wearing CPAP machine    • Obstructive sleep apnea, adult    • Osteoarthritis    • PAF (paroxysmal atrial fibrillation) (HCC)    • Pain of cervical spine     INTERMITTENT   • PONV (postoperative nausea and vomiting)     PRIOR TO DIPROVAN USE   • Post-menopausal    • Pulsatile tinnitus of both ears    • Vitamin D deficiency        Past Surgical History:   Procedure Laterality Date   • BUNIONECTOMY     • CARDIAC ABLATION  12/2013    for atrial flutter   • COLONOSCOPY N/A 10/18/2016    Procedure: COLONOSCOPY into cecum and terminal ileum.  with polypectomy;  Surgeon: Eusebio Cartagena MD;  Location: [facility] ENDOSCOPY;  Service:    • HYSTERECTOMY  2012    WITH ANTERIOR REPAIR   • REPLACEMENT TOTAL KNEE Right 05/14/2013   • REPLACEMENT TOTAL KNEE Left 11/05/2013   • TUBAL ABDOMINAL LIGATION         Social History     Socioeconomic History   • Marital status:    Tobacco Use   • Smoking status: Never Smoker   • Smokeless tobacco: Never Used   • Tobacco comment: caffeine use / tea/ 6 CUPS DAILY    Vaping Use   • Vaping Use: Never used   Substance and Sexual Activity   • Alcohol use: Yes     Alcohol/week: 2.0 standard drinks     Types: 1 Glasses of wine, 1 Shots of liquor per week     Comment: couple drinks a month   • Drug use: No   • Sexual activity: Defer       Family History   Problem Relation Age of Onset   • Atrial fibrillation Mother    • Cancer Father         bladder    • Valvular heart disease Father         aortic valve replacement       Review of Systems   Constitutional: Positive for weight gain. Negative for malaise/fatigue.   Cardiovascular: Positive for leg swelling and palpitations.   Respiratory: Positive for shortness of breath and snoring.    Musculoskeletal: Positive for joint pain.   Neurological: Negative for light-headedness and numbness.   All other " "systems reviewed and are negative.      Allergies   Allergen Reactions   • Erythromycin Nausea And Vomiting   • Hydrocodone-Acetaminophen Nausea And Vomiting   • Morphine And Related Nausea And Vomiting   • Oxycodone-Acetaminophen Nausea And Vomiting         Current Outpatient Medications:   •  Cholecalciferol (VITAMIN D PO), Take 1 tablet by mouth Every Morning., Disp: , Rfl:   •  famotidine (PEPCID) 20 MG tablet, Take 20 mg by mouth 2 (Two) Times a Day., Disp: , Rfl:   •  Fluticasone Furoate 50 MCG/ACT aerosol powder , Inhale Daily., Disp: , Rfl:   •  lisinopril (PRINIVIL,ZESTRIL) 5 MG tablet, TAKE 1 TABLET BY MOUTH EVERY DAY, Disp: 90 tablet, Rfl: 3  •  metoprolol tartrate (LOPRESSOR) 50 MG tablet, Take 1 tablet in the AM and 2 tablets in the PM, Disp: 270 tablet, Rfl: 3  •  nystatin (MYCOSTATIN) 754112 UNIT/GM cream, APPLY TOPICALLY AS NEEDED FOR RASH TO AFFECTED AREA(S).., Disp: , Rfl:   •  triamcinolone (KENALOG) 0.1 % cream, APPLY TO AFFECTED AREA TWICE A DAY AS NEEDED, Disp: , Rfl:   •  Xarelto 20 MG tablet, TAKE 1 TABLET BY MOUTH EVERY DAY, Disp: 90 tablet, Rfl: 0     Objective:     Vitals:    02/21/22 1006   BP: 136/82   BP Location: Left arm   Pulse: 70   Weight: 103 kg (226 lb 6.4 oz)   Height: 167.6 cm (66\")     Body mass index is 36.54 kg/m².    Physical Exam  Vitals reviewed.   Constitutional:       General: She is not in acute distress.     Appearance: She is well-developed. She is obese.   HENT:      Head: Normocephalic.      Nose: Nose normal.      Mouth/Throat:      Comments: masked  Eyes:      Conjunctiva/sclera: Conjunctivae normal.   Neck:      Vascular: No JVD.   Cardiovascular:      Rate and Rhythm: Normal rate and regular rhythm.      Pulses: Normal pulses and intact distal pulses.      Heart sounds: Normal heart sounds. No murmur heard.      Pulmonary:      Effort: Pulmonary effort is normal.      Breath sounds: Normal breath sounds.   Abdominal:      Palpations: Abdomen is soft.      " Tenderness: There is no abdominal tenderness.   Musculoskeletal:         General: Normal range of motion.      Cervical back: Normal range of motion.   Lymphadenopathy:      Cervical: No cervical adenopathy.   Skin:     General: Skin is warm and dry.      Findings: No rash.   Neurological:      General: No focal deficit present.      Mental Status: She is alert and oriented to person, place, and time.      Cranial Nerves: No cranial nerve deficit.   Psychiatric:         Mood and Affect: Mood normal.         Behavior: Behavior normal.         Thought Content: Thought content normal.         Procedures      Assessment:       Diagnosis Plan   1. PAF (paroxysmal atrial fibrillation) ([facility])     2. Essential hypertension            Plan:       1.  Atrial Fibrillation and Atrial Flutter  Assessment  • The patient has paroxysmal atrial fibrillation  • This is non-valvular in etiology  • The patient's CHADS2-VASc score is 3  • A HAT2YM2-YUIk score of 2 or more is considered a high risk for a thromboembolic event  • Rivaroxaban prescribed    Plan  • Attempt to maintain sinus rhythm  • Continue rivaroxaban for antithrombotic therapy, bleeding issues discussed  • Continue beta blocker for rhythm control    Subjective - Objective  • The average duration of atrial fibrillation episodes is <48 hours      We talked about our options for her breakthrough episodes.  At this point, I would like her to take an extra metoprolol at the onset to hopefully make them resolve more quickly.  If she continues to have breakthrough symptoms, we could consider the addition of flecainide to her regimen.    2.  Her BP is well controlled.    Sincerely,       Dylan Stanton MD

## 2022-03-09 ENCOUNTER — OFFICE VISIT (OUTPATIENT)
Dept: SLEEP MEDICINE | Facility: HOSPITAL | Age: 67
End: 2022-03-09

## 2022-03-09 VITALS
DIASTOLIC BLOOD PRESSURE: 64 MMHG | SYSTOLIC BLOOD PRESSURE: 140 MMHG | HEART RATE: 68 BPM | HEIGHT: 66 IN | OXYGEN SATURATION: 98 % | WEIGHT: 227 LBS | BODY MASS INDEX: 36.48 KG/M2

## 2022-03-09 DIAGNOSIS — G47.30 SLEEP APNEA WITH USE OF CONTINUOUS POSITIVE AIRWAY PRESSURE (CPAP): ICD-10-CM

## 2022-03-09 PROCEDURE — G0463 HOSPITAL OUTPT CLINIC VISIT: HCPCS

## 2022-03-09 PROCEDURE — 99204 OFFICE O/P NEW MOD 45 MIN: CPT | Performed by: FAMILY MEDICINE

## 2022-03-09 NOTE — PROGRESS NOTES
[facility]ultation       Reason for Consultation: yuriy      Patient Care Team:  Rufino Foster MD as PCP - General (Family Medicine)  Al Sheffield MD as Consulting Physician (Sleep Medicine)      History of present illness:  Thank you for asking me to see your patient.  The patient is a 66 y.o. female With paroxysmal atrial fibrillation hypertension allergies vitamin D deficiency GERD and obstructive sleep apnea presents today to establish care.  Patient reports sleep study around 2015 and was prescribed a Pap breathing machine.  I have reviewed copy of PSG from August 2015 which showed overall AHI of 29.1 events per hour.  Lowest SPO2 89%.  I also reviewed copy of Pap titration study where patient was advised to start CPAP 9 cm H2O.  We have data from patient CPAP machine 2/7/2022-3/8/2022 average use of 7 hours 13 minutes set pressure 9 cm H2O average AHI 0.8.    Bedtime 12 AM to 1 AM sleep latency not long wake time 6:45 AM 1 day a week and the rest is 9 AM sleep 6 to 8 hours no rotating shifts.  Before using CPAP did have snoring and witnessed apneas as well as leg jerking however this is now resolved.  Has had weight gain 20 pounds over the past 5 years.  No family history of sleep apnea she is aware of.  Obese with BMI 36.7.    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 2 alcoholic drinks per week 4 to 5 days a day no drug use    Allergies:  Erythromycin, Hydrocodone-acetaminophen, Morphine and related, and Oxycodone-acetaminophen    Family History: YURIY no       Current Outpatient Medications:   •  Cholecalciferol (VITAMIN D PO), Take 1 tablet by mouth Every Morning., Disp: , Rfl:   •  famotidine (PEPCID) 20 MG tablet, Take 20 mg by mouth 2 (Two) Times a Day., Disp: , Rfl:   •  Fluticasone Furoate 50 MCG/ACT aerosol powder , Inhale Daily., Disp: , Rfl:   •  lisinopril (PRINIVIL,ZESTRIL) 5 MG tablet, TAKE 1 TABLET BY MOUTH EVERY DAY, Disp: 90 tablet, Rfl: 3  •   "metoprolol tartrate (LOPRESSOR) 50 MG tablet, Take 1 tablet in the AM and 2 tablets in the PM, Disp: 270 tablet, Rfl: 3  •  nystatin (MYCOSTATIN) [id number] UNIT/GM cream, APPLY TOPICALLY AS NEEDED FOR RASH TO AFFECTED AREA(S).., Disp: , Rfl:   •  triamcinolone (KENALOG) 0.1 % cream, APPLY TO AFFECTED AREA TWICE A DAY AS NEEDED, Disp: , Rfl:   •  Xarelto 20 MG tablet, TAKE 1 TABLET BY MOUTH EVERY DAY, Disp: 90 tablet, Rfl: 0    Vital Signs:    Vitals:    03/09/22 1413   BP: 140/64   Pulse: 68   SpO2: 98%   Weight: 103 kg (227 lb)   Height: 167.6 cm (66\")      Body mass index is 36.64 kg/m².  Neck Circumference: 14.5 inches      REVIEW OF SYSTEMS.  Full review of systems available on the intake form which is scanned in the media tab.  The relevant positive are noted below  1. Daytime excessive sleepiness with [place] Sleepiness Scale :Total score: 6   2. Snoring  3. Irregular heartbeat swollen ankles frequent heartburn      Physical exam:  Vitals:    03/09/22 1413   BP: 140/64   Pulse: 68   SpO2: 98%   Weight: 103 kg (227 lb)   Height: 167.6 cm (66\")    Body mass index is 36.64 kg/m². Neck Circumference: 14.5 inches  HEENT: Head is atraumatic, normocephalic  Eyes: pupils are round equal and reacting to light and accommodation, conjunctiva normal  Nose: no nasal septal defects or deviation and the nasal passages are clear, no nasal polyps,  Throat: tongue normal, oral airway Mallampati class 3  NECK:Neck Circumference: 14.5 inches, trachea is in the midline, thyroid not enlarged  RESPIRATORY SYSTEM: Breath sounds are equal on both sides, there are no wheezes   CARDIOVASULAR SYSTEM: Heart sounds are regular rhythm and will rate, no edema  EXTREMITES: No cyanosis, clubbing  NEUROLOGICAL SYSTEM: Oriented x 3, no gross motor defects, gait normal      Impression:  1. Sleep apnea with use of continuous positive airway pressure (CPAP)        Plan:    Good sleep hygiene measures should be maintained.  Weight loss would be " beneficial in this patient who is obese BMI 36.7.    Obstructive sleep apnea adequately treated with 9 cm H2O with good compliance and usage and no complaints of hypersomnolence.  Return to clinic in 1 year for follow-up or sooner if needed.    Patient uses the CPAP device and benefits from its use in terms of reduction of hypersomnia and snoring.Weight loss will be strongly beneficial to reduce the severity of sleep-disordered breathing.  Caution during activities that require prolonged concentration is strongly advised if sleepiness returns. Changing of PAP supplies regularly is important for effective use. Patient needs to change cushion on the mask or plugs on nasal pillows along with disposable filters once every month and change mask frame, tubing, headgear and Velcro straps every 6 months at the minimum.      Thank you for allowing me to participate in your patient's care.    Al Sheffield MD  Sleep Medicine  03/09/22  14:33 EST

## 2022-05-02 DIAGNOSIS — I48.0 PAF (PAROXYSMAL ATRIAL FIBRILLATION): ICD-10-CM

## 2022-05-03 RX ORDER — RIVAROXABAN 20 MG/1
TABLET, FILM COATED ORAL
Qty: 90 TABLET | Refills: 1 | Status: SHIPPED | OUTPATIENT
Start: 2022-05-03 | End: 2022-11-07

## 2022-05-17 ENCOUNTER — OFFICE (OUTPATIENT)
Dept: URBAN - METROPOLITAN AREA CLINIC 75 | Facility: CLINIC | Age: 67
End: 2022-05-17

## 2022-05-17 VITALS
SYSTOLIC BLOOD PRESSURE: 140 MMHG | WEIGHT: 222 LBS | HEIGHT: 66 IN | OXYGEN SATURATION: 96 % | DIASTOLIC BLOOD PRESSURE: 62 MMHG | HEART RATE: 63 BPM

## 2022-05-17 DIAGNOSIS — R93.3 ABNORMAL FINDINGS ON DIAGNOSTIC IMAGING OF OTHER PARTS OF DI: ICD-10-CM

## 2022-05-17 DIAGNOSIS — Z79.01 LONG TERM (CURRENT) USE OF ANTICOAGULANTS: ICD-10-CM

## 2022-05-17 DIAGNOSIS — K80.20 CALCULUS OF GALLBLADDER WITHOUT CHOLECYSTITIS WITHOUT OBSTRU: ICD-10-CM

## 2022-05-17 DIAGNOSIS — R12 HEARTBURN: ICD-10-CM

## 2022-05-17 DIAGNOSIS — I48.91 UNSPECIFIED ATRIAL FIBRILLATION: ICD-10-CM

## 2022-05-17 PROCEDURE — 99204 OFFICE O/P NEW MOD 45 MIN: CPT | Performed by: INTERNAL MEDICINE

## 2022-05-23 ENCOUNTER — OFFICE VISIT (OUTPATIENT)
Dept: FAMILY MEDICINE CLINIC | Facility: CLINIC | Age: 67
End: 2022-05-23

## 2022-05-23 VITALS
BODY MASS INDEX: 36.32 KG/M2 | OXYGEN SATURATION: 98 % | DIASTOLIC BLOOD PRESSURE: 102 MMHG | WEIGHT: 226 LBS | HEART RATE: 61 BPM | RESPIRATION RATE: 18 BRPM | SYSTOLIC BLOOD PRESSURE: 160 MMHG | HEIGHT: 66 IN

## 2022-05-23 DIAGNOSIS — Z00.00 ROUTINE HEALTH MAINTENANCE: Primary | ICD-10-CM

## 2022-05-23 DIAGNOSIS — Z23 NEED FOR VACCINATION: ICD-10-CM

## 2022-05-23 DIAGNOSIS — Z13.6 ENCOUNTER FOR LIPID SCREENING FOR CARDIOVASCULAR DISEASE: ICD-10-CM

## 2022-05-23 DIAGNOSIS — I10 ESSENTIAL HYPERTENSION: ICD-10-CM

## 2022-05-23 DIAGNOSIS — Z13.220 ENCOUNTER FOR LIPID SCREENING FOR CARDIOVASCULAR DISEASE: ICD-10-CM

## 2022-05-23 PROCEDURE — 1160F RVW MEDS BY RX/DR IN RCRD: CPT | Performed by: FAMILY MEDICINE

## 2022-05-23 PROCEDURE — G0439 PPPS, SUBSEQ VISIT: HCPCS | Performed by: FAMILY MEDICINE

## 2022-05-23 PROCEDURE — 1170F FXNL STATUS ASSESSED: CPT | Performed by: FAMILY MEDICINE

## 2022-05-23 NOTE — PATIENT INSTRUCTIONS
Mindfulness apps: Headspace, Smiling Mind, Liberate, Sowlmate    Mindfulness-Based Stress Reduction  Mindfulness-based stress reduction (MBSR) is a program that helps people learn to practice mindfulness. Mindfulness is the practice of intentionally paying attention to the present moment. It can be learned and practiced through techniques such as education, breathing exercises, meditation, and yoga. MBSR includes several mindfulness techniques in one program.  MBSR works best when you understand the treatment, are willing to try new things, and can commit to spending time practicing what you learn. MBSR training may include learning about:  How your emotions, thoughts, and reactions affect your body.  New ways to respond to things that cause negative thoughts to start (triggers).  How to notice your thoughts and let go of them.  Practicing awareness of everyday things that you normally do without thinking.  The techniques and goals of different types of meditation.  What are the benefits of MBSR?  MBSR can have many benefits, which include helping you to:  Develop self-awareness. This refers to knowing and understanding yourself.  Learn skills and attitudes that help you to participate in your own health care.  Learn new ways to care for yourself.  Be more accepting about how things are, and let things go.  Be less judgmental and approach things with an open mind.  Be patient with yourself and trust yourself more.  MBSR has also been shown to:  Reduce negative emotions, such as depression and anxiety.  Improve memory and focus.  Change how you sense and approach pain.  Boost your body's ability to fight infections.  Help you connect better with other people.  Improve your sense of well-being.  Follow these instructions at home:    Find a local in-person or online MBSR program.  Set aside some time regularly for mindfulness practice.  Find a mindfulness practice that works best for you. This may include one or more  of the following:  Meditation. Meditation involves focusing your mind on a certain thought or activity.  Breathing awareness exercises. These help you to stay present by focusing on your breath.  Body scan. For this practice, you lie down and pay attention to each part of your body from head to toe. You can identify tension and soreness and intentionally relax parts of your body.  Yoga. Yoga involves stretching and breathing, and it can improve your ability to move and be flexible. It can also provide an experience of testing your body's limits, which can help you release stress.  Mindful eating. This way of eating involves focusing on the taste, texture, color, and smell of each bite of food. Because this slows down eating and helps you feel full sooner, it can be an important part of a weight-loss plan.  Find a podcast or recording that provides guidance for breathing awareness, body scan, or meditation exercises. You can listen to these any time when you have a free moment to rest without distractions.  Follow your treatment plan as told by your health care provider. This may include taking regular medicines and making changes to your diet or lifestyle as recommended.  How to practice mindfulness  To do a basic awareness exercise:  Find a comfortable place to sit.  Pay attention to the present moment. Observe your thoughts, feelings, and surroundings just as they are.  Avoid placing judgment on yourself, your feelings, or your surroundings. Make note of any judgment that comes up, and let it go.  Your mind may wander, and that is okay. Make note of when your thoughts drift, and return your attention to the present moment.  To do basic mindfulness meditation:  Find a comfortable place to sit. This may include a stable chair or a firm floor cushion.  Sit upright with your back straight. Let your arms fall next to your side with your hands resting on your legs.  If sitting in a chair, rest your feet flat on the  floor.  If sitting on a cushion, cross your legs in front of you.  Keep your head in a neutral position with your chin dropped slightly. Relax your jaw and rest the tip of your tongue on the roof of your mouth. Drop your gaze to the floor. You can close your eyes if you like.  Breathe normally and pay attention to your breath. Feel the air moving in and out of your nose. Feel your belly expanding and relaxing with each breath.  Your mind may wander, and that is okay. Make note of when your thoughts drift, and return your attention to your breath.  Avoid placing judgment on yourself, your feelings, or your surroundings. Make note of any judgment or feelings that come up, let them go, and bring your attention back to your breath.  When you are ready, lift your gaze or open your eyes. Pay attention to how your body feels after the meditation.  Where to find more information  You can find more information about MBSR from:  Your health care provider.  Community-based meditation centers or programs.  Programs offered near you.  Summary  Mindfulness-based stress reduction (MBSR) is a program that teaches you how to intentionally pay attention to the present moment. It is used with other treatments to help you cope better with daily stress, emotions, and pain.  MBSR focuses on developing self-awareness, which allows you to respond to life stress without judgment or negative emotions.  MBSR programs may involve learning different mindfulness practices, such as breathing exercises, meditation, yoga, body scan, or mindful eating. Find a mindfulness practice that works best for you, and set aside time for it on a regular basis.  This information is not intended to replace advice given to you by your health care provider. Make sure you discuss any questions you have with your health care provider.  Document Released: 04/26/2018 Document Revised: 11/30/2018 Document Reviewed: 04/26/2018  Elsevier Patient Education © 2020 [employer]

## 2022-05-23 NOTE — PROGRESS NOTES
The ABCs of the Annual Wellness Visit  Subsequent Medicare Wellness Visit    Chief Complaint   Patient presents with   • Annual Exam     Subsequent medicare Wellness       Subjective    History of Present Illness:  Luiza Melendrez is a 67 y.o. female who presents for a Subsequent Medicare Wellness Visit.    Here primarily for subsequent Medicare wellness visit. Doing well overall. No real questions or concerns at this time. Continues on a regimen as prescribed, reports good tolerance. Blood pressure is elevated today, does not routinely check it at home. We will begin doing so. Denies any signs or symptoms of high blood pressure at this time.    The following portions of the patient's history were reviewed and   updated as appropriate: allergies, current medications, past family history, past medical history, past social history, past surgical history and problem list.    Compared to one year ago, the patient feels her physical   health is the same.    Compared to one year ago, the patient feels her mental   health is the same.    Recent Hospitalizations:  She was not admitted to the hospital during the last year.       Current Medical Providers:  Patient Care Team:  Rufino Foster MD as PCP - General (Family Medicine)    Outpatient Medications Prior to Visit   Medication Sig Dispense Refill   • Cholecalciferol (VITAMIN D PO) Take 1 tablet by mouth Every Morning.     • famotidine (PEPCID) 20 MG tablet Take 20 mg by mouth 2 (Two) Times a Day.     • Fluticasone Furoate 50 MCG/ACT aerosol powder  Inhale Daily.     • lisinopril (PRINIVIL,ZESTRIL) 5 MG tablet TAKE 1 TABLET BY MOUTH EVERY DAY 90 tablet 3   • metoprolol tartrate (LOPRESSOR) 50 MG tablet Take 1 tablet in the AM and 2 tablets in the  tablet 3   • nystatin (MYCOSTATIN) 883184 UNIT/GM cream APPLY TOPICALLY AS NEEDED FOR RASH TO AFFECTED AREA(S)..     • triamcinolone (KENALOG) 0.1 % cream APPLY TO AFFECTED AREA TWICE A DAY AS NEEDED     • Xarelto  "20 MG tablet TAKE 1 TABLET BY MOUTH EVERY DAY 90 tablet 1     No facility-administered medications prior to visit.       No opioid medication identified on active medication list. I have reviewed chart for other potential  high risk medication/s and harmful drug interactions in the elderly.          Aspirin is not on active medication list.  Aspirin use is not indicated based on review of current medical condition/s. Risk of harm outweighs potential benefits.  .    Patient Active Problem List   Diagnosis   • Prolapse of vaginal wall   • Second degree uterine prolapse   • PAF (paroxysmal atrial fibrillation) ([facility])   • Gastroesophageal reflux disease without esophagitis   • Vitamin D deficiency   • History of bladder cancer   • Allergies   • Class 2 severe obesity due to excess calories with serious comorbidity and body mass index (BMI) of 35.0 to 35.9 in adult ([facility])   • Essential hypertension     Advance Care Planning  Advance Directive is on file.  ACP discussion was held with the patient during this visit. Patient has an advance directive in EMR which is still valid.           Objective    Vitals:    05/23/22 1021   BP: (!) 160/102   Pulse: 61   Resp: 18   SpO2: 98%   Weight: 103 kg (226 lb)   Height: 167.6 cm (66\")     Class 2 Severe Obesity (BMI >=35 and <=39.9). Obesity-related health conditions include the following: hypertension. Obesity is unchanged. BMI is is above average; BMI management plan is completed. We discussed portion control and increasing exercise.  Does the patient have evidence of cognitive impairment? No    Physical Exam  Vitals and nursing note reviewed.   Constitutional:       General: She is not in acute distress.     Appearance: Normal appearance. She is not ill-appearing.   Cardiovascular:      Rate and Rhythm: Normal rate and regular rhythm.      Pulses: Normal pulses.      Heart sounds: Normal heart sounds. No murmur heard.  Pulmonary:      Effort: Pulmonary effort is normal. No " respiratory distress.      Breath sounds: Normal breath sounds. No rales.   Neurological:      Mental Status: She is alert and oriented to person, place, and time. Mental status is at baseline.   Psychiatric:         Mood and Affect: Mood normal.         Behavior: Behavior normal.                 HEALTH RISK ASSESSMENT    Smoking Status:  Social History     Tobacco Use   Smoking Status Never Smoker   Smokeless Tobacco Never Used   Tobacco Comment    caffeine use / tea/ 6 CUPS DAILY      Alcohol Consumption:  Social History     Substance and Sexual Activity   Alcohol Use Yes   • Alcohol/week: 2.0 standard drinks   • Types: 1 Glasses of wine, 1 Shots of liquor per week    Comment: couple drinks a month     Fall Risk Screen:    [facility] Fall Risk Assessment has not been completed.    Depression Screening:  PHQ-2/PHQ-9 Depression Screening 7/2/2021   Retired PHQ-9 Total Score 0   Retired Total Score 0       Health Habits and Functional and Cognitive Screening:  Functional & Cognitive Status 5/23/2022   Do you have difficulty preparing food and eating? No   Do you have difficulty bathing yourself, getting dressed or grooming yourself? No   Do you have difficulty using the toilet? No   Do you have difficulty moving around from place to place? No   Do you have trouble with steps or getting out of a bed or a chair? No   Current Diet Other        Current Diet Comment healthy with junk food at times   Dental Exam Up to date   Eye Exam Not up to date   Exercise (times per week) 3 times per week   Current Exercises Include Walking   Do you need help using the phone?  No   Are you deaf or do you have serious difficulty hearing?  No   Do you need help with transportation? No   Do you need help shopping? No   Do you need help preparing meals?  No   Do you need help with housework?  No   Do you need help with laundry? No   Do you need help taking your medications? No   Do you need help managing money? No   Do you ever drive or ride  in a car without wearing a seat belt? No   Have you felt unusual stress, anger or loneliness in the last month? No   Who do you live with? Spouse   If you need help, do you have trouble finding someone available to you? No   Do you have difficulty concentrating, remembering or making decisions? No       Age-appropriate Screening Schedule:  Refer to the list below for future screening recommendations based on patient's age, sex and/or medical conditions. Orders for these recommended tests are listed in the plan section. The patient has been provided with a written plan.    Health Maintenance   Topic Date Due   • DXA SCAN  Never done   • ZOSTER VACCINE (2 of 3) 12/27/2017   • TDAP/TD VACCINES (2 - Td or Tdap) 11/02/2020   • LIPID PANEL  03/24/2022   • INFLUENZA VACCINE  08/01/2022   • MAMMOGRAM  04/11/2023   • PAP SMEAR  Discontinued              Assessment & Plan   CMS Preventative Services Quick Reference  Risk Factors Identified During Encounter  Cardiovascular Disease  Fall Risk-High or Moderate  Inactivity/Sedentary  Obesity/Overweight   The above risks/problems have been discussed with the patient.  Follow up actions/plans if indicated are seen below in the Assessment/Plan Section.  Pertinent information has been shared with the patient in the After Visit Summary.    Diagnoses and all orders for this visit:    1. Routine health maintenance (Primary)    2. Essential hypertension  -     Comprehensive Metabolic Panel    3. Encounter for lipid screening for cardiovascular disease  -     Lipid Panel    4. Need for vaccination    Orders as above. I will contact her with results as available. Encouraged her to start monitoring her blood pressure at home. Can simply double her lisinopril as needed for ongoing high blood pressures.    Recommended she get the Shingrix and pneumonia vaccines at her pharmacy.    Recommended follow-up as below. Encouraged communication via [employer] meantime.    Follow Up:   Return in about 6  months (around 11/23/2022) for F/u HTN.     An After Visit Summary and PPPS were made available to the patient.                Prevention counseling performed as below: Mindfulness for stress management.

## 2022-05-24 LAB
ALBUMIN SERPL-MCNC: 4 G/DL (ref 3.8–4.8)
ALBUMIN/GLOB SERPL: 1.7 {RATIO} (ref 1.2–2.2)
ALP SERPL-CCNC: 58 IU/L (ref 44–121)
ALT SERPL-CCNC: 18 IU/L (ref 0–32)
AST SERPL-CCNC: 16 IU/L (ref 0–40)
BILIRUB SERPL-MCNC: 0.4 MG/DL (ref 0–1.2)
BUN SERPL-MCNC: 17 MG/DL (ref 8–27)
BUN/CREAT SERPL: 21 (ref 12–28)
CALCIUM SERPL-MCNC: 9.4 MG/DL (ref 8.7–10.3)
CHLORIDE SERPL-SCNC: 107 MMOL/L (ref 96–106)
CHOLEST SERPL-MCNC: 248 MG/DL (ref 100–199)
CO2 SERPL-SCNC: 21 MMOL/L (ref 20–29)
CREAT SERPL-MCNC: 0.82 MG/DL (ref 0.57–1)
EGFRCR SERPLBLD CKD-EPI 2021: 78 ML/MIN/1.73
GLOBULIN SER CALC-MCNC: 2.3 G/DL (ref 1.5–4.5)
GLUCOSE SERPL-MCNC: 97 MG/DL (ref 65–99)
HDLC SERPL-MCNC: 37 MG/DL
LDLC SERPL CALC-MCNC: 162 MG/DL (ref 0–99)
POTASSIUM SERPL-SCNC: 4.2 MMOL/L (ref 3.5–5.2)
PROT SERPL-MCNC: 6.3 G/DL (ref 6–8.5)
SODIUM SERPL-SCNC: 143 MMOL/L (ref 134–144)
TRIGL SERPL-MCNC: 261 MG/DL (ref 0–149)
VLDLC SERPL CALC-MCNC: 49 MG/DL (ref 5–40)

## 2022-06-06 RX ORDER — LISINOPRIL 5 MG/1
TABLET ORAL
Qty: 90 TABLET | Refills: 2 | Status: SHIPPED | OUTPATIENT
Start: 2022-06-06 | End: 2023-03-07 | Stop reason: SDUPTHER

## 2022-06-14 ENCOUNTER — TELEPHONE (OUTPATIENT)
Dept: CARDIOLOGY | Facility: CLINIC | Age: 67
End: 2022-06-14

## 2022-06-14 NOTE — TELEPHONE ENCOUNTER
Date of Office Visit:  2022  Encounter Provider:  Dylan Stanton MD  Place of Service:  [facility] CARDIOLOGY  Patient Name: Luiza Melendrez  :  1955      To Whom it May Concern:      Ms Melendrez has hypertension and paroxysmal atrial fibrillation; she is at low risk of major adverse CV events with surgery.    It is typically the practice in our institution to hold a DOAC (medications like rivaroxaban) for 2 days prior and 1 day after laparoscopic surgery, not 5 days (which is more typical for vitamin K antagonists like warfarin). Is there a reason why it is suspected that she will need a longer period off her anticoagulant?      If it can be held for only 2-3 days, then that would be preferable.     Please contact our office with any questions or concerns. As always, it has been a pleasure to participate in your patient's care.      Dylan Stanton MD  [facility]

## 2022-06-14 NOTE — TELEPHONE ENCOUNTER
Romero with Dr. Simon Arndt's office calls for cardiac risk assessment and recommendations for Pt holding her xarelto prior to procedure. They would like to hold for a total of 5 days    Pt is scheduled to have laparoscopic cholecystectomy 8/19/22.    LOV 2/21/22 JK    Assessment:        Diagnosis Plan   1. PAF (paroxysmal atrial fibrillation) (HCC)      2. Essential hypertension               Plan:       1.  Atrial Fibrillation and Atrial Flutter  Assessment  • The patient has paroxysmal atrial fibrillation  • This is non-valvular in etiology  • The patient's CHADS2-VASc score is 3  • A [id number] score of 2 or more is considered a high risk for a thromboembolic event  • Rivaroxaban prescribed     Plan  • Attempt to maintain sinus rhythm  • Continue rivaroxaban for antithrombotic therapy, bleeding issues discussed  • Continue beta blocker for rhythm control     Subjective - Objective  • The average duration of atrial fibrillation episodes is <48 hours      We talked about our options for her breakthrough episodes.  At this point, I would like her to take an extra metoprolol at the onset to hopefully make them resolve more quickly.  If she continues to have breakthrough symptoms, we could consider the addition of flecainide to her regimen.     2.  Her BP is well controlled.     Sincerely,         Dylan Stanton MD

## 2022-09-15 ENCOUNTER — TELEPHONE (OUTPATIENT)
Dept: CARDIOLOGY | Facility: CLINIC | Age: 67
End: 2022-09-15

## 2022-09-15 NOTE — TELEPHONE ENCOUNTER
Pt is scheduled for a transurethral resection of the bladder/ tumor with Dr. Salinas King with [facility] on 10/05.  Pt is on Xarelto and will need pre op assessment.

## 2022-09-15 NOTE — TELEPHONE ENCOUNTER
Date of Office Visit:  09/15/2022  Encounter Provider:  Dylan Stanton MD  Place of Service:  [facility] CARDIOLOGY  Patient Name: Luiza Melendrez  :  1955    To Whom it May Concern:    Ms Melendrez has a history of paroxysmal atrial fibrillation. Her rivaroxaban can be held for 2 days prior to surgery. Her perioperative MACE risk is low.    Please contact our office with any questions or concerns. As always, it has been a pleasure to participate in your patient's care.    Dylan Stanton MD  [facility]

## 2022-09-28 ENCOUNTER — PRE-ADMISSION TESTING (OUTPATIENT)
Dept: PREADMISSION TESTING | Facility: HOSPITAL | Age: 67
End: 2022-09-28

## 2022-09-28 VITALS
SYSTOLIC BLOOD PRESSURE: 138 MMHG | BODY MASS INDEX: 36.29 KG/M2 | HEIGHT: 66 IN | DIASTOLIC BLOOD PRESSURE: 78 MMHG | TEMPERATURE: 97.7 F | WEIGHT: 225.8 LBS | OXYGEN SATURATION: 98 % | HEART RATE: 72 BPM | RESPIRATION RATE: 16 BRPM

## 2022-09-28 LAB
ANION GAP SERPL CALCULATED.3IONS-SCNC: 10.8 MMOL/L (ref 5–15)
BUN SERPL-MCNC: 13 MG/DL (ref 8–23)
BUN/CREAT SERPL: 15.1 (ref 7–25)
CALCIUM SPEC-SCNC: 10.1 MG/DL (ref 8.6–10.5)
CHLORIDE SERPL-SCNC: 105 MMOL/L (ref 98–107)
CO2 SERPL-SCNC: 26.2 MMOL/L (ref 22–29)
CREAT SERPL-MCNC: 0.86 MG/DL (ref 0.57–1)
DEPRECATED RDW RBC AUTO: 42 FL (ref 37–54)
EGFRCR SERPLBLD CKD-EPI 2021: 73.7 ML/MIN/1.73
ERYTHROCYTE [DISTWIDTH] IN BLOOD BY AUTOMATED COUNT: 13.1 % (ref 12.3–15.4)
GLUCOSE SERPL-MCNC: 107 MG/DL (ref 65–99)
HCT VFR BLD AUTO: 37.3 % (ref 34–46.6)
HGB BLD-MCNC: 12.9 G/DL (ref 12–15.9)
MCH RBC QN AUTO: 30.4 PG (ref 26.6–33)
MCHC RBC AUTO-ENTMCNC: 34.6 G/DL (ref 31.5–35.7)
MCV RBC AUTO: 87.8 FL (ref 79–97)
PLATELET # BLD AUTO: 263 10*3/MM3 (ref 140–450)
PMV BLD AUTO: 10.2 FL (ref 6–12)
POTASSIUM SERPL-SCNC: 4.5 MMOL/L (ref 3.5–5.2)
QT INTERVAL: 432 MS
RBC # BLD AUTO: 4.25 10*6/MM3 (ref 3.77–5.28)
SODIUM SERPL-SCNC: 142 MMOL/L (ref 136–145)
WBC NRBC COR # BLD: 5.71 10*3/MM3 (ref 3.4–10.8)

## 2022-09-28 PROCEDURE — 80048 BASIC METABOLIC PNL TOTAL CA: CPT

## 2022-09-28 PROCEDURE — 93010 ELECTROCARDIOGRAM REPORT: CPT | Performed by: INTERNAL MEDICINE

## 2022-09-28 PROCEDURE — 93005 ELECTROCARDIOGRAM TRACING: CPT

## 2022-09-28 PROCEDURE — 85027 COMPLETE CBC AUTOMATED: CPT

## 2022-09-28 PROCEDURE — 36415 COLL VENOUS BLD VENIPUNCTURE: CPT

## 2022-10-05 ENCOUNTER — ANESTHESIA (OUTPATIENT)
Dept: PERIOP | Facility: HOSPITAL | Age: 67
End: 2022-10-05

## 2022-10-05 ENCOUNTER — HOSPITAL ENCOUNTER (OUTPATIENT)
Facility: HOSPITAL | Age: 67
Setting detail: HOSPITAL OUTPATIENT SURGERY
Discharge: HOME | End: 2022-10-05
Attending: UROLOGY | Admitting: UROLOGY

## 2022-10-05 ENCOUNTER — ANESTHESIA EVENT (OUTPATIENT)
Dept: PERIOP | Facility: HOSPITAL | Age: 67
End: 2022-10-05

## 2022-10-05 VITALS
WEIGHT: 228.62 LBS | BODY MASS INDEX: 36.74 KG/M2 | RESPIRATION RATE: 16 BRPM | HEART RATE: 69 BPM | OXYGEN SATURATION: 96 % | DIASTOLIC BLOOD PRESSURE: 75 MMHG | SYSTOLIC BLOOD PRESSURE: 169 MMHG | TEMPERATURE: 98.1 F | HEIGHT: 66 IN

## 2022-10-05 DIAGNOSIS — D49.4 BLADDER TUMOR: ICD-10-CM

## 2022-10-05 PROBLEM — C67.1 MALIGNANT NEOPLASM OF DOME OF URINARY BLADDER (HCC): Status: ACTIVE | Noted: 2022-10-05

## 2022-10-05 PROCEDURE — 25010000002 DEXAMETHASONE SODIUM PHOSPHATE 20 MG/5ML SOLUTION: Performed by: NURSE ANESTHETIST, CERTIFIED REGISTERED

## 2022-10-05 PROCEDURE — 25010000002 ONDANSETRON PER 1 MG: Performed by: NURSE ANESTHETIST, CERTIFIED REGISTERED

## 2022-10-05 PROCEDURE — 25010000002 PROPOFOL 10 MG/ML EMULSION: Performed by: NURSE ANESTHETIST, CERTIFIED REGISTERED

## 2022-10-05 PROCEDURE — 25010000002 CEFAZOLIN IN DEXTROSE 2-4 GM/100ML-% SOLUTION: Performed by: UROLOGY

## 2022-10-05 PROCEDURE — 25010000002 FENTANYL CITRATE (PF) 50 MCG/ML SOLUTION: Performed by: NURSE ANESTHETIST, CERTIFIED REGISTERED

## 2022-10-05 RX ORDER — MAGNESIUM HYDROXIDE 1200 MG/15ML
LIQUID ORAL AS NEEDED
Status: DISCONTINUED | OUTPATIENT
Start: 2022-10-05 | End: 2022-10-05 | Stop reason: HOSPADM

## 2022-10-05 RX ORDER — ONDANSETRON 2 MG/ML
INJECTION INTRAMUSCULAR; INTRAVENOUS AS NEEDED
Status: DISCONTINUED | OUTPATIENT
Start: 2022-10-05 | End: 2022-10-05 | Stop reason: SURG

## 2022-10-05 RX ORDER — SODIUM CHLORIDE 0.9 % (FLUSH) 0.9 %
3-10 SYRINGE (ML) INJECTION AS NEEDED
Status: DISCONTINUED | OUTPATIENT
Start: 2022-10-05 | End: 2022-10-05 | Stop reason: HOSPADM

## 2022-10-05 RX ORDER — PROPOFOL 10 MG/ML
VIAL (ML) INTRAVENOUS AS NEEDED
Status: DISCONTINUED | OUTPATIENT
Start: 2022-10-05 | End: 2022-10-05 | Stop reason: SURG

## 2022-10-05 RX ORDER — FENTANYL CITRATE 50 UG/ML
50 INJECTION, SOLUTION INTRAMUSCULAR; INTRAVENOUS
Status: DISCONTINUED | OUTPATIENT
Start: 2022-10-05 | End: 2022-10-05 | Stop reason: HOSPADM

## 2022-10-05 RX ORDER — LIDOCAINE HYDROCHLORIDE 20 MG/ML
INJECTION, SOLUTION EPIDURAL; INFILTRATION; INTRACAUDAL; PERINEURAL AS NEEDED
Status: DISCONTINUED | OUTPATIENT
Start: 2022-10-05 | End: 2022-10-05 | Stop reason: SURG

## 2022-10-05 RX ORDER — PHENAZOPYRIDINE HYDROCHLORIDE 200 MG/1
200 TABLET, FILM COATED ORAL 3 TIMES DAILY PRN
Qty: 30 TABLET | Refills: 0 | Status: SHIPPED | OUTPATIENT
Start: 2022-10-05 | End: 2023-02-23

## 2022-10-05 RX ORDER — METOPROLOL TARTRATE 50 MG/1
TABLET, FILM COATED ORAL
Qty: 270 TABLET | Refills: 1 | Status: SHIPPED | OUTPATIENT
Start: 2022-10-05 | End: 2023-03-07 | Stop reason: SDUPTHER

## 2022-10-05 RX ORDER — SULFAMETHOXAZOLE AND TRIMETHOPRIM 800; 160 MG/1; MG/1
1 TABLET ORAL 2 TIMES DAILY
Qty: 10 TABLET | Refills: 0 | Status: SHIPPED | OUTPATIENT
Start: 2022-10-05 | End: 2022-10-10

## 2022-10-05 RX ORDER — MIDAZOLAM HYDROCHLORIDE 1 MG/ML
0.5 INJECTION INTRAMUSCULAR; INTRAVENOUS
Status: DISCONTINUED | OUTPATIENT
Start: 2022-10-05 | End: 2022-10-05 | Stop reason: HOSPADM

## 2022-10-05 RX ORDER — DIPHENHYDRAMINE HYDROCHLORIDE 50 MG/ML
12.5 INJECTION INTRAMUSCULAR; INTRAVENOUS
Status: DISCONTINUED | OUTPATIENT
Start: 2022-10-05 | End: 2022-10-05 | Stop reason: HOSPADM

## 2022-10-05 RX ORDER — PROMETHAZINE HYDROCHLORIDE 25 MG/1
25 TABLET ORAL ONCE AS NEEDED
Status: DISCONTINUED | OUTPATIENT
Start: 2022-10-05 | End: 2022-10-05 | Stop reason: HOSPADM

## 2022-10-05 RX ORDER — ONDANSETRON 2 MG/ML
4 INJECTION INTRAMUSCULAR; INTRAVENOUS ONCE AS NEEDED
Status: DISCONTINUED | OUTPATIENT
Start: 2022-10-05 | End: 2022-10-05 | Stop reason: HOSPADM

## 2022-10-05 RX ORDER — HYDRALAZINE HYDROCHLORIDE 20 MG/ML
5 INJECTION INTRAMUSCULAR; INTRAVENOUS
Status: DISCONTINUED | OUTPATIENT
Start: 2022-10-05 | End: 2022-10-05 | Stop reason: HOSPADM

## 2022-10-05 RX ORDER — SODIUM CHLORIDE, SODIUM LACTATE, POTASSIUM CHLORIDE, CALCIUM CHLORIDE 600; 310; 30; 20 MG/100ML; MG/100ML; MG/100ML; MG/100ML
9 INJECTION, SOLUTION INTRAVENOUS CONTINUOUS
Status: DISCONTINUED | OUTPATIENT
Start: 2022-10-05 | End: 2022-10-05 | Stop reason: HOSPADM

## 2022-10-05 RX ORDER — SODIUM CHLORIDE 0.9 % (FLUSH) 0.9 %
3 SYRINGE (ML) INJECTION EVERY 12 HOURS SCHEDULED
Status: DISCONTINUED | OUTPATIENT
Start: 2022-10-05 | End: 2022-10-05 | Stop reason: HOSPADM

## 2022-10-05 RX ORDER — EPHEDRINE SULFATE 50 MG/ML
5 INJECTION, SOLUTION INTRAVENOUS ONCE AS NEEDED
Status: DISCONTINUED | OUTPATIENT
Start: 2022-10-05 | End: 2022-10-05 | Stop reason: HOSPADM

## 2022-10-05 RX ORDER — DEXAMETHASONE SODIUM PHOSPHATE 4 MG/ML
INJECTION, SOLUTION INTRA-ARTICULAR; INTRALESIONAL; INTRAMUSCULAR; INTRAVENOUS; SOFT TISSUE AS NEEDED
Status: DISCONTINUED | OUTPATIENT
Start: 2022-10-05 | End: 2022-10-05 | Stop reason: SURG

## 2022-10-05 RX ORDER — PROMETHAZINE HYDROCHLORIDE 25 MG/1
25 SUPPOSITORY RECTAL ONCE AS NEEDED
Status: DISCONTINUED | OUTPATIENT
Start: 2022-10-05 | End: 2022-10-05 | Stop reason: HOSPADM

## 2022-10-05 RX ORDER — LABETALOL HYDROCHLORIDE 5 MG/ML
5 INJECTION, SOLUTION INTRAVENOUS
Status: DISCONTINUED | OUTPATIENT
Start: 2022-10-05 | End: 2022-10-05 | Stop reason: HOSPADM

## 2022-10-05 RX ORDER — FLUMAZENIL 0.1 MG/ML
0.2 INJECTION INTRAVENOUS AS NEEDED
Status: DISCONTINUED | OUTPATIENT
Start: 2022-10-05 | End: 2022-10-05 | Stop reason: HOSPADM

## 2022-10-05 RX ORDER — LIDOCAINE HYDROCHLORIDE 10 MG/ML
0.5 INJECTION, SOLUTION EPIDURAL; INFILTRATION; INTRACAUDAL; PERINEURAL ONCE AS NEEDED
Status: DISCONTINUED | OUTPATIENT
Start: 2022-10-05 | End: 2022-10-05 | Stop reason: HOSPADM

## 2022-10-05 RX ORDER — DIPHENHYDRAMINE HCL 25 MG
25 CAPSULE ORAL
Status: DISCONTINUED | OUTPATIENT
Start: 2022-10-05 | End: 2022-10-05 | Stop reason: HOSPADM

## 2022-10-05 RX ORDER — NALOXONE HCL 0.4 MG/ML
0.2 VIAL (ML) INJECTION AS NEEDED
Status: DISCONTINUED | OUTPATIENT
Start: 2022-10-05 | End: 2022-10-05 | Stop reason: HOSPADM

## 2022-10-05 RX ORDER — CEFAZOLIN SODIUM 2 G/100ML
2 INJECTION, SOLUTION INTRAVENOUS ONCE
Status: COMPLETED | OUTPATIENT
Start: 2022-10-05 | End: 2022-10-05

## 2022-10-05 RX ORDER — FAMOTIDINE 10 MG/ML
20 INJECTION, SOLUTION INTRAVENOUS ONCE
Status: COMPLETED | OUTPATIENT
Start: 2022-10-05 | End: 2022-10-05

## 2022-10-05 RX ADMIN — ONDANSETRON 4 MG: 2 INJECTION INTRAMUSCULAR; INTRAVENOUS at 11:48

## 2022-10-05 RX ADMIN — LIDOCAINE HYDROCHLORIDE 100 MG: 20 INJECTION, SOLUTION EPIDURAL; INFILTRATION; INTRACAUDAL; PERINEURAL at 11:31

## 2022-10-05 RX ADMIN — CEFAZOLIN SODIUM 2 G: 2 INJECTION, SOLUTION INTRAVENOUS at 11:20

## 2022-10-05 RX ADMIN — FENTANYL CITRATE 50 MCG: 50 INJECTION INTRAMUSCULAR; INTRAVENOUS at 12:25

## 2022-10-05 RX ADMIN — PROPOFOL 100 MG: 10 INJECTION, EMULSION INTRAVENOUS at 11:35

## 2022-10-05 RX ADMIN — DEXAMETHASONE SODIUM PHOSPHATE 8 MG: 4 INJECTION, SOLUTION INTRAMUSCULAR; INTRAVENOUS at 11:40

## 2022-10-05 RX ADMIN — SODIUM CHLORIDE, POTASSIUM CHLORIDE, SODIUM LACTATE AND CALCIUM CHLORIDE 9 ML/HR: 600; 310; 30; 20 INJECTION, SOLUTION INTRAVENOUS at 10:46

## 2022-10-05 RX ADMIN — PROPOFOL 200 MG: 10 INJECTION, EMULSION INTRAVENOUS at 11:31

## 2022-10-05 RX ADMIN — FAMOTIDINE 20 MG: 10 INJECTION INTRAVENOUS at 10:45

## 2022-10-05 NOTE — OP NOTE
CYSTOSCOPY TRANSURETHRAL RESECTION OF BLADDER TUMOR  Procedure Note    Luiza Melendrez  10/5/2022    Pre-op Diagnosis:   Bladder Tumor    Post-op Diagnosis:     Post-Op Diagnosis Codes:     * Bladder mass [N32.89]    Procedure(s):  TRANSURETHRAL RESECTION OF BLADDER TUMOR    Surgeon(s):  Keith King MD    Anesthesia: General    Staff:   Circulator: Betty Zaidi RN; Jimmy Hall RN  Scrub Person: Brian Arndt; Jacquie Painter RN    Estimated Blood Loss: minimal    Specimens:                * No orders in the log *      Drains: * No LDAs found *    Findings: 1 cm bladder mass consistent with low-grade transitional cell carcinoma bladder resected.  Unfortunately cautery resection left no viable tissue be sent off.    Complications: None apparent    Indications: 59-year-old female with a bladder tumor at the dome of the bladder now presents for transurethral resection.  Findings and cystoscopy are consistent with a low-grade superficial bladder tumor.    Procedure: Patient was taken the operating given general anesthesia.  Form consent had been obtained.  She is placed lithotomy.  Her Xarelto had been stopped.  The resectoscope was placed up surgical timeout.  Tumor seen at the dome of the bladder.  It measured less than a centimeter had a pedicle on it and consistent with a superficial low-grade carcinoma.  It was resected.  In its resection now came out on a couple pieces and was largely stripped above with very little no viable tissue that I could send off.  It just fell apart and so unfortunately do not have a good specimen.  I looked around in the drapes just to see if there is any viable tissue and I could not appreciate any.  We placed lidocaine in the bladder she was awoken and taken to recovery in stable addition.  At the base of her bladder she did have a small papillary lesion that was very tiny that I cauterized as well and it was right next to the right ureteral  orifice.      Keith Knig MD     Date: 10/5/2022  Time: 12:09 EDT

## 2022-10-05 NOTE — H&P
"     [facility] Surgical History and Physical    Patient Care Team:  Rufino Foster MD as PCP - General (Family Medicine)    Chief complaint bladder tumor    Subjective     Patient is a 67 y.o. female presents with bladder tumor.     Review of Systems   Pertinent items are noted in HPI    Past Medical History:   Diagnosis Date   • Atrial fibrillation (HCC)    • Atrial flutter (HCC) 11/2013    s/p ablation 11/2013   • Bladder cancer (HCC)    • Bladder mass 09/28/2022   • Bladder polyp    • Edema of lower extremity     left; chronic   • Esophageal reflux disease    • Gallbladder attack     IN FEB/2022   • GERD (gastroesophageal reflux disease)    • Hypertension    • Loose, teeth     \"due to shorter than normal root length\"   • Obesity, Class I, BMI 30-34.9    • Obstructive sleep apnea     - wearing CPAP machine    • Obstructive sleep apnea, adult    • Osteoarthritis    • PAF (paroxysmal atrial fibrillation) (HCC)    • Pain of cervical spine     INTERMITTENT   • PONV (postoperative nausea and vomiting)    • Post-menopausal    • Pulsatile tinnitus of both ears    • Seasonal allergies    • Vitamin D deficiency      Past Surgical History:   Procedure Laterality Date   • BUNIONECTOMY     • CARDIAC ABLATION  12/2013    for atrial flutter   • COLONOSCOPY N/A 10/18/2016    Procedure: COLONOSCOPY into cecum and terminal ileum.  with polypectomy;  Surgeon: Eusebio Cartagena MD;  Location: [facility] ENDOSCOPY;  Service:    • HYSTERECTOMY  2012    WITH ANTERIOR REPAIR   • REPLACEMENT TOTAL KNEE Right 05/14/2013   • REPLACEMENT TOTAL KNEE Left 11/05/2013   • TUBAL ABDOMINAL LIGATION       Family History   Problem Relation Age of Onset   • Atrial fibrillation Mother    • Cancer Father         bladder    • Valvular heart disease Father         aortic valve replacement   • Malig Hyperthermia Neg Hx      Social History     Tobacco Use   • Smoking status: Never Smoker   • Smokeless tobacco: Never Used   • Tobacco comment: " "caffeine use / tea/ 6 CUPS DAILY    Vaping Use   • Vaping Use: Never used   Substance Use Topics   • Alcohol use: Yes     Alcohol/week: 2.0 standard drinks     Types: 1 Glasses of wine, 1 Shots of liquor per week     Comment: couple drinks a month   • Drug use: No       Meds:  Medications Prior to Admission   Medication Sig Dispense Refill Last Dose   • Cholecalciferol (VITAMIN D PO) Take 1 tablet by mouth Every Morning.   10/4/2022 at 0800   • famotidine (PEPCID) 20 MG tablet Take 20 mg by mouth Daily.   10/4/2022 at 2230   • lisinopril (PRINIVIL,ZESTRIL) 5 MG tablet TAKE 1 TABLET BY MOUTH EVERY DAY 90 tablet 2 10/4/2022 at 0800   • metoprolol tartrate (LOPRESSOR) 50 MG tablet TAKE 1 TABLET IN THE AM AND 2 TABLETS IN THE  tablet 1 10/4/2022 at 0800   • Fluticasone Furoate 50 MCG/ACT aerosol powder  Inhale As Needed.   More than a month at Unknown time   • Xarelto 20 MG tablet TAKE 1 TABLET BY MOUTH EVERY DAY (Patient taking differently: HOLD STARTING OCT 2, 2022) 90 tablet 1 10/1/2022       Allergies:  Aspartame, Erythromycin, Hydrocodone-acetaminophen, Morphine and related, and Oxycodone-acetaminophen    Debilities:  None    Objective     Vital Signs  Temp:  [98.6 °F (37 °C)] 98.6 °F (37 °C)  Heart Rate:  [72] 72  Resp:  [16] 16  BP: (149)/(90) 149/90  No intake or output data in the 24 hours ending 10/05/22 1119  Flowsheet Rows    Flowsheet Row First Filed Value   Admission Height 167.6 cm (66\") Documented at 10/05/2022 1002   Admission Weight 104 kg (228 lb 9.9 oz) Documented at 10/05/2022 1002           Physical Exam:     General Appearance:    Alert, cooperative, NAD   HEENT:    No trauma, pupils reactive, hearing intact   Back:     No CVA tenderness   Lungs:     Respirations unlabored, no wheezing    Heart:    RRR, intact peripheral pulses   Abdomen:     Soft, NDNT, no masses, no guarding   :   Deferred   Extremities:   No edema, no deformity   Lymphatic:   No neck or groin LAD   Skin:   No bleeding, " bruising or rashes   Neuro/Psych:   Orientation intact, mood/affect pleasant, no focal findings     Results Review:    I reviewed the patient's new clinical results.  Results for orders placed or performed in visit on 09/28/22   Basic Metabolic Panel    Specimen: Blood   Result Value Ref Range    Glucose 107 (H) 65 - 99 mg/dL    BUN 13 8 - 23 mg/dL    Creatinine 0.86 0.57 - 1.00 mg/dL    Sodium 142 136 - 145 mmol/L    Potassium 4.5 3.5 - 5.2 mmol/L    Chloride 105 98 - 107 mmol/L    CO2 26.2 22.0 - 29.0 mmol/L    Calcium 10.1 8.6 - 10.5 mg/dL    BUN/Creatinine Ratio 15.1 7.0 - 25.0    Anion Gap 10.8 5.0 - 15.0 mmol/L    eGFR 73.7 >60.0 mL/min/1.73   CBC (No Diff)    Specimen: Blood   Result Value Ref Range    WBC 5.71 3.40 - 10.80 10*3/mm3    RBC 4.25 3.77 - 5.28 10*6/mm3    Hemoglobin 12.9 12.0 - 15.9 g/dL    Hematocrit 37.3 34.0 - 46.6 %    MCV 87.8 79.0 - 97.0 fL    MCH 30.4 26.6 - 33.0 pg    MCHC 34.6 31.5 - 35.7 g/dL    RDW 13.1 12.3 - 15.4 %    RDW-SD 42.0 37.0 - 54.0 fl    MPV 10.2 6.0 - 12.0 fL    Platelets 263 140 - 450 10*3/mm3   ECG 12 Lead   Result Value Ref Range    QT Interval 432 ms        Assessment:  Bladder tumor    Plan:    Transurethral resection of bladder mass    I discussed the patient's findings and my recommendations with patient.   Risks, complications, outcomes and alternatives discussed with the patient at the bedside and office.    Keith King MD  10/05/22  11:19 EDT

## 2022-10-05 NOTE — ANESTHESIA POSTPROCEDURE EVALUATION
Patient: Luiza Melendrez    Procedure Summary     Date: 10/05/22 Room / Location: [facility] OR  / [facility] MAIN OR    Anesthesia Start: 1125 Anesthesia Stop: 1207    Procedure: TRANSURETHRAL RESECTION OF BLADDER TUMOR (N/A ) Diagnosis:       Bladder mass      (Bladder Tumor)    Surgeons: Keith King MD Provider: Nasreen King MD    Anesthesia Type: general ASA Status: 3          Anesthesia Type: general    Vitals  Vitals Value Taken Time   /83 10/05/22 1247   Temp 36.7 °C (98.1 °F) 10/05/22 1245   Pulse 62 10/05/22 1301   Resp 16 10/05/22 1245   SpO2 96 % 10/05/22 1301   Vitals shown include unvalidated device data.        Post Anesthesia Care and Evaluation    Patient location during evaluation: PACU  Patient participation: complete - patient participated  Level of consciousness: awake and alert  Pain management: adequate    Airway patency: patent  Anesthetic complications: No anesthetic complications    Cardiovascular status: acceptable  Respiratory status: acceptable  Hydration status: acceptable    Comments: ---------------------------               10/05/22                      1245         ---------------------------   BP:          168/97         Pulse:         60           Resp:          16           Temp:   36.7 °C (98.1 °F)   SpO2:          99%         ---------------------------

## 2022-10-05 NOTE — ANESTHESIA PREPROCEDURE EVALUATION
Anesthesia Evaluation     Patient summary reviewed and Nursing notes reviewed   history of anesthetic complications: PONV               Airway   Mallampati: III  TM distance: >3 FB  Neck ROM: limited  Dental    (+) poor dentition        Pulmonary    (+) sleep apnea,   Cardiovascular     ECG reviewed  PT is on anticoagulation therapy  Patient on routine beta blocker  Rhythm: regular  Rate: normal    (+) hypertension, dysrhythmias Paroxysmal Atrial Fib,       Neuro/Psych- negative ROS  GI/Hepatic/Renal/Endo    (+) morbid obesity, GERD,      Musculoskeletal     (+) neck pain,   Abdominal    Substance History - negative use     OB/GYN negative ob/gyn ROS         Other   arthritis,    history of cancer active                  Anesthesia Plan    ASA 3     general     (Loose front middle upper incisors     I have reviewed the patient's history with the patient and the chart, including all pertinent laboratory results and imaging. I have explained the risks of anesthesia including but not limited to dental damage, corneal abrasion, nerve injury, MI, stroke, and death. Questions asked and answered. Anesthetic plan discussed with patient and team as indicated. Patient expressed understanding of the above.      BMI    PAF s/p ablation currently in NSR on xarelto and metoprolol    CPAP for SCAR)  intravenous induction     Anesthetic plan, risks, benefits, and alternatives have been provided, discussed and informed consent has been obtained with: patient.        CODE STATUS:

## 2022-10-05 NOTE — ANESTHESIA PROCEDURE NOTES
Airway  Urgency: elective    Date/Time: 10/5/2022 11:37 AM  Airway not difficult    General Information and Staff    Patient location during procedure: OR  Anesthesiologist: Nasreen King MD  CRNA/CAA: Mitesh Rosado CRNA    Indications and Patient Condition  Indications for airway management: airway protection    Preoxygenated: yes  Mask difficulty assessment: 1 - vent by mask    Final Airway Details  Final airway type: supraglottic airway      Successful airway: unique  Size 4    Number of attempts at approach: 1  Assessment: lips, teeth, and gum same as pre-op and atraumatic intubation    Additional Comments  Pre 02 100%, SIVI, atraumatic intubation, BLBS, Positive ETC02.

## 2022-11-07 DIAGNOSIS — I48.0 PAF (PAROXYSMAL ATRIAL FIBRILLATION): ICD-10-CM

## 2022-11-07 RX ORDER — RIVAROXABAN 20 MG/1
TABLET, FILM COATED ORAL
Qty: 90 TABLET | Refills: 1 | Status: SHIPPED | OUTPATIENT
Start: 2022-11-07

## 2023-02-07 NOTE — PROGRESS NOTES
RM:________     PCP: Rufino Foster MD    : 1955  AGE: 67 y.o.  EST PATIENT   REASON FOR VISIT/  CC:    BP Readings from Last 3 Encounters:   10/05/22 169/75   22 138/78   22 (!) 160/102        WT: ____________ BP: __________L __________R HR______    CHEST PAIN: _____________    SOA: _____________PALPS: _______________     LIGHTHEADED: ___________FATIGUE: ________________ EDEMA __________    ALLERGIES:Aspartame, Erythromycin, Hydrocodone-acetaminophen, Morphine and related, and Oxycodone-acetaminophen SMOKING HISTORY:  Social History     Tobacco Use   • Smoking status: Never   • Smokeless tobacco: Never   • Tobacco comments:     caffeine use / tea/ 6 CUPS DAILY    Vaping Use   • Vaping Use: Never used   Substance Use Topics   • Alcohol use: Yes     Alcohol/week: 2.0 standard drinks     Types: 1 Glasses of wine, 1 Shots of liquor per week     Comment: couple drinks a month   • Drug use: No     CAFFEINE USE_________________  ALCOHOL ______________________    Below is the patient's most recent value for Albumin, ALT, AST, BUN, Calcium, Chloride, Cholesterol, CO2, Creatinine, GFR, Glucose, HDL, Hematocrit, Hemoglobin, Hemoglobin A1C, LDL, Magnesium, Phosphorus, Platelets, Potassium, PSA, Sodium, Triglycerides, TSH and WBC.   Lab Results   Component Value Date    ALBUMIN 4.0 2022    ALT 18 2022    AST 16 2022    BUN 13 2022    CALCIUM 10.1 2022     2022    CO2 26.2 2022    CREATININE 0.86 2022    HDL 37 (L) 2022    HCT 37.3 2022    HGB 12.9 2022     (H) 2022     2022    K 4.5 2022     2022    TRIG 261 (H) 2022    WBC 5.71 2022          NEW DIAGNOSIS/ SURGERY/ HOSP OR ED VISITS: ______________________    __________________________________________________________________      RECENT LABS OR DIAGNOSTIC TESTING:   _____________________________    __________________________________________________________________      ASSESSMENT/ PLAN: _______________________________________________    __________________________________________________________________

## 2023-02-23 ENCOUNTER — OFFICE VISIT (OUTPATIENT)
Dept: CARDIOLOGY | Facility: CLINIC | Age: 68
End: 2023-02-23
Payer: MEDICARE

## 2023-02-23 VITALS
HEART RATE: 69 BPM | HEIGHT: 66 IN | WEIGHT: 227.8 LBS | SYSTOLIC BLOOD PRESSURE: 120 MMHG | DIASTOLIC BLOOD PRESSURE: 78 MMHG | BODY MASS INDEX: 36.61 KG/M2

## 2023-02-23 DIAGNOSIS — Z86.79 STATUS POST ABLATION OF ATRIAL FLUTTER: ICD-10-CM

## 2023-02-23 DIAGNOSIS — I10 ESSENTIAL HYPERTENSION: ICD-10-CM

## 2023-02-23 DIAGNOSIS — I48.0 PAF (PAROXYSMAL ATRIAL FIBRILLATION): Primary | ICD-10-CM

## 2023-02-23 DIAGNOSIS — Z98.890 STATUS POST ABLATION OF ATRIAL FLUTTER: ICD-10-CM

## 2023-02-23 PROCEDURE — 93000 ELECTROCARDIOGRAM COMPLETE: CPT | Performed by: INTERNAL MEDICINE

## 2023-02-23 PROCEDURE — 99214 OFFICE O/P EST MOD 30 MIN: CPT | Performed by: INTERNAL MEDICINE

## 2023-02-23 NOTE — PROGRESS NOTES
"Date of Office Visit: 2023  Encounter Provider: Dylan Stanton MD  Place of Service: [facility] CARDIOLOGY  Patient Name: Luiza Melendrez  :1955    Chief Complaint   Patient presents with   • Atrial Fibrillation   :   HPI: Luiza Melendrez is a 67 y.o. female who presents today to follow up. I have reviewed prior notes and there are no changes except for any new updates described below. I have also reviewed any information entered into the medical record by the patient or by ancillary staff.     She presented with atrial flutter in 2013 and underwent successful ablation. In 2016, she went to the ED with palpitations and was found to be in atrial fibrillation and quickly converted to sinus rhythm. She was seen in our office soon thereafter and had an echocardiogram which was normal except for mild-moderate left atrial dilation. She was placed on metoprolol, and was not initially anticoagulated due to her own concerns as well as the fact that she needed a colonscopy. Eventually, she did agree to OAC, and she was started on rivaroxaban.     In May 2019, she reported intermittent palpitations and some dyspnea; a stress echo was normal.    She had a rough period of ~ 3 weeks in late December and early January, with frequent AF episodes. However, they have resolved. She has some mild edema/generalized puffiness but denies dyspnea, orthopnea or PND. She does eat a high salt diet.     Past Medical History:   Diagnosis Date   • Atrial fibrillation (HCC)    • Atrial flutter (HCC) 2013    s/p ablation 2013   • Bladder cancer (HCC)    • Bladder mass 2022   • Bladder polyp    • Edema of lower extremity     left; chronic   • Esophageal reflux disease    • Gallbladder attack     [place]    • GERD (gastroesophageal reflux disease)    • Hypertension    • Loose, teeth     \"due to shorter than normal root length\"   • Malignant neoplasm of dome of urinary bladder " ([facility]) 10/5/2022   • Obesity, Class I, BMI 30-34.9    • Obstructive sleep apnea     - wearing CPAP machine    • Obstructive sleep apnea, adult    • Osteoarthritis    • PAF (paroxysmal atrial fibrillation) ([facility])    • Pain of cervical spine     INTERMITTENT   • PONV (postoperative nausea and vomiting)    • Post-menopausal    • Pulsatile tinnitus of both ears    • Seasonal allergies    • Vitamin D deficiency        Past Surgical History:   Procedure Laterality Date   • BUNIONECTOMY     • CARDIAC ABLATION  12/2013    for atrial flutter   • COLONOSCOPY N/A 10/18/2016    Procedure: COLONOSCOPY into cecum and terminal ileum.  with polypectomy;  Surgeon: Eusebio Cartagena MD;  Location: [facility] ENDOSCOPY;  Service:    • HYSTERECTOMY  2012    WITH ANTERIOR REPAIR   • REPLACEMENT TOTAL KNEE Right 05/14/2013   • REPLACEMENT TOTAL KNEE Left 11/05/2013   • TRANSURETHRAL RESECTION OF BLADDER TUMOR N/A 10/5/2022    Procedure: TRANSURETHRAL RESECTION OF BLADDER TUMOR;  Surgeon: Keith King MD;  Location: [facility] MAIN OR;  Service: Urology;  Laterality: N/A;   • TUBAL ABDOMINAL LIGATION         Social History     Socioeconomic History   • Marital status:    Tobacco Use   • Smoking status: Never   • Smokeless tobacco: Never   • Tobacco comments:     caffeine use / tea/ 6 CUPS DAILY    Vaping Use   • Vaping Use: Never used   Substance and Sexual Activity   • Alcohol use: Yes     Alcohol/week: 2.0 standard drinks     Types: 1 Glasses of wine, 1 Shots of liquor per week     Comment: couple drinks a month   • Drug use: No   • Sexual activity: Defer       Family History   Problem Relation Age of Onset   • Atrial fibrillation Mother    • Cancer Father         bladder    • Valvular heart disease Father         aortic valve replacement   • Malig Hyperthermia Neg Hx        Review of Systems   Constitutional: Negative for malaise/fatigue.   Cardiovascular: Positive for leg swelling and palpitations.   Respiratory: Positive for  "snoring.    Musculoskeletal: Positive for joint pain.   Neurological: Negative for light-headedness and numbness.   All other systems reviewed and are negative.      Allergies   Allergen Reactions   • Aspartame Hives   • Erythromycin Nausea And Vomiting   • Hydrocodone-Acetaminophen Nausea And Vomiting   • Morphine And Related Nausea And Vomiting   • Oxycodone-Acetaminophen Nausea And Vomiting         Current Outpatient Medications:   •  Cholecalciferol (VITAMIN D PO), Take 1 tablet by mouth Every Morning., Disp: , Rfl:   •  famotidine (PEPCID) 20 MG tablet, Take 20 mg by mouth Daily., Disp: , Rfl:   •  Fluticasone Furoate 50 MCG/ACT aerosol powder , Inhale As Needed., Disp: , Rfl:   •  lisinopril (PRINIVIL,ZESTRIL) 5 MG tablet, TAKE 1 TABLET BY MOUTH EVERY DAY, Disp: 90 tablet, Rfl: 2  •  metoprolol tartrate (LOPRESSOR) 50 MG tablet, TAKE 1 TABLET IN THE AM AND 2 TABLETS IN THE PM, Disp: 270 tablet, Rfl: 1  •  Xarelto 20 MG tablet, TAKE 1 TABLET BY MOUTH EVERY DAY, Disp: 90 tablet, Rfl: 1     Objective:     Vitals:    02/23/23 1028   BP: 120/78   BP Location: Right arm   Pulse: 69   Weight: 103 kg (227 lb 12.8 oz)   Height: 167.6 cm (66\")     Body mass index is 36.77 kg/m².    Physical Exam  Vitals reviewed.   Constitutional:       General: She is not in acute distress.     Appearance: She is well-developed. She is obese.   HENT:      Head: Normocephalic.      Nose: Nose normal.      Mouth/Throat:      Comments: masked  Eyes:      Conjunctiva/sclera: Conjunctivae normal.   Neck:      Vascular: No JVD.   Cardiovascular:      Rate and Rhythm: Normal rate and regular rhythm.      Pulses: Normal pulses and intact distal pulses.      Heart sounds: Normal heart sounds. No murmur heard.  Pulmonary:      Effort: Pulmonary effort is normal.      Breath sounds: Normal breath sounds.   Abdominal:      Palpations: Abdomen is soft.      Tenderness: There is no abdominal tenderness.   Musculoskeletal:         General: Normal " range of motion.      Cervical back: Normal range of motion.   Lymphadenopathy:      Cervical: No cervical adenopathy.   Skin:     General: Skin is warm and dry.      Findings: No rash.   Neurological:      General: No focal deficit present.      Mental Status: She is alert and oriented to person, place, and time.      Cranial Nerves: No cranial nerve deficit.   Psychiatric:         Mood and Affect: Mood normal.         Behavior: Behavior normal.         Thought Content: Thought content normal.           ECG 12 Lead    Date/Time: 2/23/2023 10:42 AM  Performed by: Dylan Stanton MD  Authorized by: Dylan Stanton MD   Comparison: compared with previous ECG   Similar to previous ECG  Rhythm: sinus rhythm  Conduction: conduction normal  ST Segments: ST segments normal  T flattening: III and aVF  QRS axis: normal  Other: no other findings    Clinical impression: normal ECG            Assessment:       Diagnosis Plan   1. PAF (paroxysmal atrial fibrillation) ([facility])  ECG 12 Lead      2. Status post ablation of atrial flutter        3. Essential hypertension           Plan:       1/2.  Atrial Fibrillation and Atrial Flutter  Assessment  • The patient has paroxysmal atrial fibrillation  • This is non-valvular in etiology  • The patient's CHADS2-VASc score is 3  • A [id number] score of 2 or more is considered a high risk for a thromboembolic event  • Rivaroxaban prescribed    Plan  • Attempt to maintain sinus rhythm  • Continue rivaroxaban for antithrombotic therapy, bleeding issues discussed  • Continue beta blocker for rhythm control    Subjective - Objective  • The average duration of atrial fibrillation episodes is <48 hours      We talked about our options for her breakthrough episodes. She will continue to use PRN metoprolol for these.  If she continues to have breakthrough symptoms, we could consider the addition of flecainide to her regimen.    2.  Her BP is well controlled. She reports mild intermittent puffiness. I  would start by decreasing dietary sodium; if it persists, we can consider low dose spironolactone, 12.5mg daily.    Sincerely,       Dylan Stanton MD

## 2023-03-07 RX ORDER — METOPROLOL TARTRATE 50 MG/1
TABLET, FILM COATED ORAL
Qty: 270 TABLET | Refills: 1 | Status: SHIPPED | OUTPATIENT
Start: 2023-03-07 | End: 2023-03-28

## 2023-03-07 RX ORDER — LISINOPRIL 5 MG/1
5 TABLET ORAL DAILY
Qty: 90 TABLET | Refills: 1 | Status: SHIPPED | OUTPATIENT
Start: 2023-03-07

## 2023-03-07 NOTE — TELEPHONE ENCOUNTER
Caller: Luiza Melendrez    Relationship: Self    Best call back number: [phone]    Requested Prescriptions:   Requested Prescriptions     Pending Prescriptions Disp Refills   • lisinopril (PRINIVIL,ZESTRIL) 5 MG tablet 90 tablet 2     Sig: Take 1 tablet by mouth Daily.   • metoprolol tartrate (LOPRESSOR) 50 MG tablet 270 tablet 1        Pharm[facility] where request should be sent: [id number] [address] AT [facility] [phone] [facility] [phone] FX     Additional details provided by patient: PATIENT HAS A ONE DAY SUPPLY LEFT.     Does the patient have less than a 3 day supply:  [x] Yes  [] No    Would you like a call back once the refill request has been completed: [] Yes [x] No    If the office needs to give you a call back, can they leave a voicemail: [] Yes [x] No    Skyler Solano Rep   03/07/23 15:00 EST

## 2023-03-08 RX ORDER — LISINOPRIL 5 MG/1
TABLET ORAL
Qty: 90 TABLET | Refills: 2 | OUTPATIENT
Start: 2023-03-08

## 2023-03-28 RX ORDER — METOPROLOL TARTRATE 50 MG/1
TABLET, FILM COATED ORAL
Qty: 270 TABLET | Refills: 1 | Status: SHIPPED | OUTPATIENT
Start: 2023-03-28

## 2023-05-08 DIAGNOSIS — I48.0 PAF (PAROXYSMAL ATRIAL FIBRILLATION): ICD-10-CM

## 2023-05-09 RX ORDER — RIVAROXABAN 20 MG/1
TABLET, FILM COATED ORAL
Qty: 90 TABLET | Refills: 1 | Status: SHIPPED | OUTPATIENT
Start: 2023-05-09

## 2023-05-31 ENCOUNTER — OFFICE VISIT (OUTPATIENT)
Dept: FAMILY MEDICINE CLINIC | Facility: CLINIC | Age: 68
End: 2023-05-31

## 2023-05-31 VITALS
DIASTOLIC BLOOD PRESSURE: 80 MMHG | WEIGHT: 229 LBS | SYSTOLIC BLOOD PRESSURE: 132 MMHG | RESPIRATION RATE: 19 BRPM | BODY MASS INDEX: 36.96 KG/M2 | OXYGEN SATURATION: 98 % | HEART RATE: 65 BPM

## 2023-05-31 DIAGNOSIS — K80.20 SYMPTOMATIC CHOLELITHIASIS: ICD-10-CM

## 2023-05-31 DIAGNOSIS — E66.01 CLASS 2 SEVERE OBESITY DUE TO EXCESS CALORIES WITH SERIOUS COMORBIDITY AND BODY MASS INDEX (BMI) OF 36.0 TO 36.9 IN ADULT: Primary | ICD-10-CM

## 2023-05-31 PROCEDURE — 99213 OFFICE O/P EST LOW 20 MIN: CPT | Performed by: FAMILY MEDICINE

## 2023-05-31 PROCEDURE — 1160F RVW MEDS BY RX/DR IN RCRD: CPT | Performed by: FAMILY MEDICINE

## 2023-05-31 PROCEDURE — 3079F DIAST BP 80-89 MM HG: CPT | Performed by: FAMILY MEDICINE

## 2023-05-31 PROCEDURE — 3075F SYST BP GE 130 - 139MM HG: CPT | Performed by: FAMILY MEDICINE

## 2023-05-31 PROCEDURE — 1159F MED LIST DOCD IN RCRD: CPT | Performed by: FAMILY MEDICINE

## 2023-05-31 NOTE — PROGRESS NOTES
Chief Complaint  Obesity and Abdominal Pain    Subjective    History of Present Illness {CC  Problem List  Visit  Diagnosis   Encounters  Notes  Medications  Labs  Result Review Imaging  Media :23}     Luiza Melendrez presents to [facility] for Obesity and Abdominal Pain.  History of Present Illness     Here today with complaints as above. Is frustrated by her inability to lose weight, ready to take more proactive action. Weight has been relatively stable over the past several years. Has done weight watchers in the past, has not really put forth any effort recently. Drinks a fair amount of sweet tea, does not watch her diet much.    Has never seen a nutritionist but would be interested in doing so. Also rather sedentary at present. Admits that she could easily participate with [employer] and find a gym membership.    Has some ongoing right upper quadrant pain with known symptomatic cholelithiasis. Was previously scheduled for gallbladder removal though canceled it secondary to some concern for the possible specter of chronic diarrhea. Has an appointment with surgeon upcoming to reevaluate.    Objective     Vital Signs:   /80   Pulse 65   Resp 19   Wt 104 kg (229 lb)   SpO2 98%   BMI 36.96 kg/m²   Physical Exam  Vitals and nursing note reviewed.   Constitutional:       General: She is not in acute distress.     Appearance: Normal appearance. She is not ill-appearing.   Cardiovascular:      Rate and Rhythm: Normal rate and regular rhythm.      Pulses: Normal pulses.      Heart sounds: Normal heart sounds. No murmur heard.  Pulmonary:      Effort: Pulmonary effort is normal. No respiratory distress.      Breath sounds: Normal breath sounds. No rales.   Neurological:      Mental Status: She is alert and oriented to person, place, and time. Mental status is at baseline.   Psychiatric:         Mood and Affect: Mood normal.         Behavior: Behavior normal.           Result Review  Data Reviewed:{ Labs  Result Review  Imaging  Med Tab  Media :23}                   Assessment and Plan {CC Problem List  Visit Diagnosis  ROS  Review (Popup)  Health Maintenance  Quality  BestPractice  Medications  SmartSets  SnapShot Encounters  Media :23}   Diagnoses and all orders for this visit:    1. Class 2 severe obesity due to excess calories with serious comorbidity and body mass index (BMI) of 36.0 to 36.9 in adult (Primary)  -     Ambulatory Referral to Nutrition Services    2. Symptomatic cholelithiasis    Long discussion about management of obesity. We will start with a referral to nutrition as above. Strongly encouraged her to become more active as well.    Follow-up with surgery as scheduled for symptomatic cholelithiasis.    Recommended follow-up as below. Encouraged communication via [employer] in the meantime.    Patient was given instructions and counseling regarding her condition or for health maintenance advice. Please see specific information pulled into the AVS (placed there by myself) if appropriate.    Return in about 2 months (around 7/31/2023), or if symptoms worsen or fail to improve, for Medicare Wellness.      MARILYN Foster MD

## 2023-06-08 ENCOUNTER — TELEPHONE (OUTPATIENT)
Dept: FAMILY MEDICINE CLINIC | Facility: CLINIC | Age: 68
End: 2023-06-08
Payer: MEDICARE

## 2023-06-08 NOTE — TELEPHONE ENCOUNTER
Dr. Titi Arndt's office called.  Patient is schedule for gall bladder removal on 6/14/23.  Patient is on Xaretlto. Asking if they can stopped the medication, how long can she be off of medication and directions on restarting medication.  [phone]

## 2023-09-05 RX ORDER — LISINOPRIL 5 MG/1
5 TABLET ORAL DAILY
Qty: 90 TABLET | Refills: 1 | Status: SHIPPED | OUTPATIENT
Start: 2023-09-05

## 2023-09-25 RX ORDER — METOPROLOL TARTRATE 50 MG/1
TABLET, FILM COATED ORAL
Qty: 270 TABLET | Refills: 0 | Status: SHIPPED | OUTPATIENT
Start: 2023-09-25

## 2023-11-17 ENCOUNTER — TELEPHONE (OUTPATIENT)
Dept: FAMILY MEDICINE CLINIC | Facility: CLINIC | Age: 68
End: 2023-11-17
Payer: MEDICARE

## 2023-11-17 DIAGNOSIS — I48.0 PAF (PAROXYSMAL ATRIAL FIBRILLATION): ICD-10-CM

## 2023-11-17 RX ORDER — RIVAROXABAN 20 MG/1
TABLET, FILM COATED ORAL
Qty: 90 TABLET | Refills: 1 | Status: SHIPPED | OUTPATIENT
Start: 2023-11-17

## 2023-11-17 NOTE — TELEPHONE ENCOUNTER
Patient called asking if Dr. Foster would prescribe Ambien for her, she has a prescription from when she had knee surgery.  Only uses it PRN.  She currently has 10mg but usually cuts them in half.  Call back number [phone]

## 2023-11-20 ENCOUNTER — TELEPHONE (OUTPATIENT)
Dept: FAMILY MEDICINE CLINIC | Facility: CLINIC | Age: 68
End: 2023-11-20

## 2023-11-20 NOTE — TELEPHONE ENCOUNTER
Caller: Luiza Melendrez    Relationship: Self    Best call back number: [phone]     What orders are you requesting (i.e. lab or imaging): BLOOD WORK    In what timeframe would the patient need to come in: 1 WEEK PRIOR TO ANNUAL PHYSICAL 12/5/23    Where will you receive your lab/imaging services: IN OFFICE    Additional notes:

## 2023-12-05 ENCOUNTER — OFFICE VISIT (OUTPATIENT)
Dept: FAMILY MEDICINE CLINIC | Facility: CLINIC | Age: 68
End: 2023-12-05
Payer: MEDICARE

## 2023-12-05 VITALS
HEART RATE: 67 BPM | RESPIRATION RATE: 18 BRPM | HEIGHT: 65 IN | BODY MASS INDEX: 36.82 KG/M2 | SYSTOLIC BLOOD PRESSURE: 128 MMHG | OXYGEN SATURATION: 98 % | DIASTOLIC BLOOD PRESSURE: 70 MMHG | WEIGHT: 221 LBS

## 2023-12-05 DIAGNOSIS — E66.01 CLASS 2 SEVERE OBESITY DUE TO EXCESS CALORIES WITH SERIOUS COMORBIDITY AND BODY MASS INDEX (BMI) OF 36.0 TO 36.9 IN ADULT: ICD-10-CM

## 2023-12-05 DIAGNOSIS — Z00.00 ROUTINE HEALTH MAINTENANCE: Primary | ICD-10-CM

## 2023-12-05 DIAGNOSIS — E55.9 VITAMIN D DEFICIENCY: ICD-10-CM

## 2023-12-05 DIAGNOSIS — J06.9 VIRAL URI WITH COUGH: ICD-10-CM

## 2023-12-05 DIAGNOSIS — G47.09 OTHER INSOMNIA: ICD-10-CM

## 2023-12-05 DIAGNOSIS — Z13.1 SCREENING FOR DIABETES MELLITUS: ICD-10-CM

## 2023-12-05 DIAGNOSIS — Z23 NEED FOR VACCINATION: ICD-10-CM

## 2023-12-05 DIAGNOSIS — Z13.6 ENCOUNTER FOR LIPID SCREENING FOR CARDIOVASCULAR DISEASE: ICD-10-CM

## 2023-12-05 DIAGNOSIS — I10 ESSENTIAL HYPERTENSION: ICD-10-CM

## 2023-12-05 DIAGNOSIS — Z13.220 ENCOUNTER FOR LIPID SCREENING FOR CARDIOVASCULAR DISEASE: ICD-10-CM

## 2023-12-05 PROBLEM — K76.0 HEPATIC STEATOSIS: Status: ACTIVE | Noted: 2023-06-13

## 2023-12-05 RX ORDER — BENZONATATE 100 MG/1
100 CAPSULE ORAL 3 TIMES DAILY PRN
Qty: 30 CAPSULE | Refills: 2 | Status: SHIPPED | OUTPATIENT
Start: 2023-12-05

## 2023-12-05 RX ORDER — ZOLPIDEM TARTRATE 5 MG/1
5 TABLET ORAL NIGHTLY PRN
Qty: 30 TABLET | Refills: 1 | Status: SHIPPED | OUTPATIENT
Start: 2023-12-05

## 2023-12-05 NOTE — PROGRESS NOTES
The ABCs of the Annual Wellness Visit  Subsequent Medicare Wellness Visit    Subjective    Luiza Melendrez is a 68 y.o. female who presents for a Subsequent Medicare Wellness Visit.    Here today for wellness visit. Doing well overall. Has a few minor issues she would like to discuss.    Due for some routine blood work. Is otherwise fairly well caught up on preventive health recommendations.    The following portions of the patient's history were reviewed and   updated as appropriate: allergies, current medications, past family history, past medical history, past social history, past surgical history, and problem list.    Compared to one year ago, the patient feels her physical   health is the same.    Compared to one year ago, the patient feels her mental   health is the same.    Recent Hospitalizations:  She was admitted within the past 365 days at [facility].       Current Medical Providers:  Patient Care Team:  Rufino Foster MD as PCP - General (Family Medicine)    Outpatient Medications Prior to Visit   Medication Sig Dispense Refill    Cholecalciferol (VITAMIN D PO) Take 1 tablet by mouth Every Morning.      famotidine (PEPCID) 20 MG tablet Take 1 tablet by mouth Daily.      Fluticasone Furoate 50 MCG/ACT aerosol powder  Inhale As Needed.      lisinopril (PRINIVIL,ZESTRIL) 5 MG tablet TAKE 1 TABLET BY MOUTH DAILY 90 tablet 1    metoprolol tartrate (LOPRESSOR) 50 MG tablet TAKE 1 TABLET BY MOUTH EVERY MORNING AND 2 TABLETS EVERY EVENING 270 tablet 0    rivaroxaban (Xarelto) 20 MG tablet TAKE 1 TABLET BY MOUTH EVERY DAY 90 tablet 1     No facility-administered medications prior to visit.       No opioid medication identified on active medication list. I have reviewed chart for other potential  high risk medication/s and harmful drug interactions in the elderly.        Aspirin is not on active medication list.  Aspirin use is not indicated based on review of current medical condition/s. Risk of  "harm outweighs potential benefits.  .    Patient Active Problem List   Diagnosis    Prolapse of vaginal wall    Second degree uterine prolapse    PAF (paroxysmal atrial fibrillation)    Gastroesophageal reflux disease without esophagitis    Vitamin D deficiency    History of bladder cancer    Allergies    Class 2 severe obesity due to excess calories with serious comorbidity and body mass index (BMI) of 36.0 to 36.9 in adult    Essential hypertension    Malignant neoplasm of dome of urinary bladder    Status post ablation of atrial flutter    Symptomatic cholelithiasis    Hepatic steatosis     Advance Care Planning   Advance Care Planning     Advance Directive is on file.  ACP discussion was held with the patient during this visit. Patient has an advance directive in EMR which is still valid.      Objective    Vitals:    23 1129   BP: 128/70   Pulse: 67   Resp: 18   SpO2: 98%   Weight: 100 kg (221 lb)   Height: 165.1 cm (65\")     Estimated body mass index is 36.78 kg/m² as calculated from the following:    Height as of this encounter: 165.1 cm (65\").    Weight as of this encounter: 100 kg (221 lb).           Does the patient have evidence of cognitive impairment? No          HEALTH RISK ASSESSMENT    Smoking Status:  Social History     Tobacco Use   Smoking Status Never   Smokeless Tobacco Never   Tobacco Comments    caffeine use / tea/ 6 CUPS DAILY      Alcohol Consumption:  Social History     Substance and Sexual Activity   Alcohol Use Yes    Alcohol/week: 2.0 standard drinks of alcohol    Types: 1 Glasses of wine, 1 Shots of liquor per week    Comment: couple drinks a month     Fall Risk Screen:    SIMIN Fall Risk Assessment was completed, and patient is at LOW risk for falls.Assessment completed on:2023    Depression Screenin/5/2023    11:29 AM   PHQ-2/PHQ-9 Depression Screening   Little Interest or Pleasure in Doing Things 0-->not at all   Feeling Down, Depressed or Hopeless 0-->not at all "   PHQ-9: Brief Depression Severity Measure Score 0       Health Habits and Functional and Cognitive Screenin/5/2023    11:31 AM   Functional & Cognitive Status   Do you have difficulty preparing food and eating? No   Do you have difficulty bathing yourself, getting dressed or grooming yourself? No   Do you have difficulty using the toilet? No   Do you have difficulty moving around from place to place? No   Do you have trouble with steps or getting out of a bed or a chair? No   Current Diet Well Balanced Diet   Dental Exam Up to date   Eye Exam Not up to date   Exercise (times per week) 0 times per week   Current Exercises Include No Regular Exercise   Do you need help using the phone?  No   Are you deaf or do you have serious difficulty hearing?  No   Do you need help to go to places out of walking distance? No   Do you need help shopping? No   Do you need help preparing meals?  No   Do you need help with housework?  No   Do you need help with laundry? No   Do you need help taking your medications? No   Do you need help managing money? No   Do you ever drive or ride in a car without wearing a seat belt? No   Have you felt unusual stress, anger or loneliness in the last month? No   Who do you live with? Spouse   If you need help, do you have trouble finding someone available to you? No   Do you have difficulty concentrating, remembering or making decisions? No       Age-appropriate Screening Schedule:  Refer to the list below for future screening recommendations based on patient's age, sex and/or medical conditions. Orders for these recommended tests are listed in the plan section. The patient has been provided with a written plan.    Health Maintenance   Topic Date Due    DXA SCAN  Never done    TDAP/TD VACCINES (2 - Td or Tdap) 2020    ZOSTER VACCINE (3 of 3) 2022    LIPID PANEL  2023    MAMMOGRAM  2024    BMI FOLLOWUP  2024    ANNUAL WELLNESS VISIT  2024    COLORECTAL  "CANCER SCREENING  10/20/2026    HEPATITIS C SCREENING  Completed    COVID-19 Vaccine  Completed    INFLUENZA VACCINE  Completed    Pneumococcal Vaccine 65+  Completed    PAP SMEAR  Discontinued                  CMS Preventative Services Quick Reference  Risk Factors Identified During Encounter  Inactivity/Sedentary: Patient was advised to exercise at least 150 minutes a week per CDC recommendations.  The above risks/problems have been discussed with the patient.  Pertinent information has been shared with the patient in the After Visit Summary.  An After Visit Summary and PPPS were made available to the patient.    Follow Up:   Next Medicare Wellness visit to be scheduled in 1 year.       Additional E&M Note during same encounter follows:  Patient has multiple medical problems which are significant and separately identifiable that require additional work above and beyond the Medicare Wellness Visit.      Chief Complaint  Medicare Wellness-subsequent and Insomnia    Subjective        HPI  Luiza Melendrez is also being seen today for has had some cough and insomnia of late. Cough started with some URI symptoms most of which have since resolved. Only a dry cough remains. Has done quite well with benzonatate capsules in the past, hoping for the same today.    Has had a difficult time with sleep initiation at times. Took some old zolpidem with decent results. Was never taking more than 1 or 2 doses a week, which seemed to work well for her. Hoping for a new prescription today.    Objective   Vital Signs:  /70   Pulse 67   Resp 18   Ht 165.1 cm (65\")   Wt 100 kg (221 lb)   SpO2 98%   BMI 36.78 kg/m²     Physical Exam  Vitals and nursing note reviewed.   Constitutional:       General: She is not in acute distress.     Appearance: Normal appearance. She is not ill-appearing.   Cardiovascular:      Rate and Rhythm: Normal rate and regular rhythm.      Pulses: Normal pulses.      Heart sounds: Normal heart sounds. " No murmur heard.  Pulmonary:      Effort: Pulmonary effort is normal. No respiratory distress.      Breath sounds: Normal breath sounds. No rales.   Neurological:      Mental Status: She is alert and oriented to person, place, and time. Mental status is at baseline.   Psychiatric:         Mood and Affect: Mood normal.         Behavior: Behavior normal.                         Assessment and Plan   Diagnoses and all orders for this visit:    1. Routine health maintenance (Primary)    2. Essential hypertension  -     Comprehensive Metabolic Panel    3. Vitamin D deficiency  -     Vitamin D,25-Hydroxy    4. Other insomnia  -     zolpidem (AMBIEN) 5 MG tablet; Take 1 tablet by mouth At Night As Needed for Sleep.  Dispense: 30 tablet; Refill: 1    5. Viral URI with cough  -     benzonatate (Tessalon Perles) 100 MG capsule; Take 1 capsule by mouth 3 (Three) Times a Day As Needed for Cough.  Dispense: 30 capsule; Refill: 2    6. Class 2 severe obesity due to excess calories with serious comorbidity and body mass index (BMI) of 36.0 to 36.9 in adult    7. Encounter for lipid screening for cardiovascular disease  -     Lipid Panel    8. Screening for diabetes mellitus  -     Comprehensive Metabolic Panel    9. Need for vaccination    Orders as above. I will contact her with lab results as available. Medications as discussed.    Recommended tetanus vaccine at her convenience, or she will find records for our review. Same with most recent Shingrix vaccine.    Encouraged to continue working on healthy lifestyle habits. Recommended follow-up as below. Encouraged communication he Mitran the meantime.       Follow Up   Return in about 6 months (around 6/5/2024), or if symptoms worsen or fail to improve.  Patient was given instructions and counseling regarding her condition or for health maintenance advice. Please see specific information pulled into the AVS if appropriate.     Prevention counseling performed as below: Mindfulness  for stress management.

## 2023-12-06 LAB
25(OH)D3+25(OH)D2 SERPL-MCNC: 62.2 NG/ML (ref 30–100)
ALBUMIN SERPL-MCNC: 4.3 G/DL (ref 3.9–4.9)
ALBUMIN/GLOB SERPL: 2.3 {RATIO} (ref 1.2–2.2)
ALP SERPL-CCNC: 61 IU/L (ref 44–121)
ALT SERPL-CCNC: 12 IU/L (ref 0–32)
AST SERPL-CCNC: 13 IU/L (ref 0–40)
BILIRUB SERPL-MCNC: 0.6 MG/DL (ref 0–1.2)
BUN SERPL-MCNC: 15 MG/DL (ref 8–27)
BUN/CREAT SERPL: 17 (ref 12–28)
CALCIUM SERPL-MCNC: 9.6 MG/DL (ref 8.7–10.3)
CHLORIDE SERPL-SCNC: 102 MMOL/L (ref 96–106)
CHOLEST SERPL-MCNC: 201 MG/DL (ref 100–199)
CO2 SERPL-SCNC: 24 MMOL/L (ref 20–29)
CREAT SERPL-MCNC: 0.88 MG/DL (ref 0.57–1)
EGFRCR SERPLBLD CKD-EPI 2021: 71 ML/MIN/1.73
GLOBULIN SER CALC-MCNC: 1.9 G/DL (ref 1.5–4.5)
GLUCOSE SERPL-MCNC: 98 MG/DL (ref 70–99)
HDLC SERPL-MCNC: 37 MG/DL
LDLC SERPL CALC-MCNC: 125 MG/DL (ref 0–99)
POTASSIUM SERPL-SCNC: 4.8 MMOL/L (ref 3.5–5.2)
PROT SERPL-MCNC: 6.2 G/DL (ref 6–8.5)
SODIUM SERPL-SCNC: 143 MMOL/L (ref 134–144)
TRIGL SERPL-MCNC: 218 MG/DL (ref 0–149)
VLDLC SERPL CALC-MCNC: 39 MG/DL (ref 5–40)

## 2023-12-14 ENCOUNTER — TELEPHONE (OUTPATIENT)
Dept: FAMILY MEDICINE CLINIC | Facility: CLINIC | Age: 68
End: 2023-12-14

## 2023-12-14 RX ORDER — PREDNISONE 20 MG/1
40 TABLET ORAL DAILY
Qty: 10 TABLET | Refills: 0 | Status: SHIPPED | OUTPATIENT
Start: 2023-12-14 | End: 2023-12-19

## 2023-12-14 NOTE — TELEPHONE ENCOUNTER
Caller: Luiza Melendrez    Relationship: Self    Best call back number: [phone]     What was the call regarding: PATIENT CALLING TO LET  KNOW THAT HER COUGH ISNT ANY BETTER SHE STATED THAT THE WHEEZING HASN'T GOTTEN ANY BETTER AS WELL. SHE WAS UP ALL NOT COUGHING SHE WOULD LIKE TO KNOW IF THERE IS SOMETHING SHE COULD BE PRESCRIBED.

## 2023-12-26 RX ORDER — METOPROLOL TARTRATE 50 MG/1
TABLET, FILM COATED ORAL
Qty: 270 TABLET | Refills: 0 | Status: SHIPPED | OUTPATIENT
Start: 2023-12-26

## 2023-12-27 ENCOUNTER — TELEPHONE (OUTPATIENT)
Dept: FAMILY MEDICINE CLINIC | Facility: CLINIC | Age: 68
End: 2023-12-27
Payer: MEDICARE

## 2023-12-27 NOTE — TELEPHONE ENCOUNTER
Called and informed we have no availability in office this week with limited providers. Advised urgent care or the like for immediate evaluation. I offered to work her into NP's schedules next week, pt declined.

## 2023-12-27 NOTE — TELEPHONE ENCOUNTER
Patient called requesting to be seen, advised urgent care, patient declined said she wanted to speak to Dr. Foster.  Complaining of cough since 11/19/23 has been on steroids and while on steroids the cough eased up, but once steroid were completed began coughing again, with shortness of breath and wheezing.  Taking Stahist, Robitussin (stopped because it was making her sick at her stomach), and using the Tessalon Pearls.

## 2024-01-22 ENCOUNTER — TELEPHONE (OUTPATIENT)
Dept: FAMILY MEDICINE CLINIC | Facility: CLINIC | Age: 69
End: 2024-01-22
Payer: MEDICARE

## 2024-01-22 RX ORDER — ALBUTEROL SULFATE 90 UG/1
2 AEROSOL, METERED RESPIRATORY (INHALATION) EVERY 4 HOURS PRN
Qty: 18 G | Refills: 5 | Status: SHIPPED | OUTPATIENT
Start: 2024-01-22

## 2024-01-22 NOTE — TELEPHONE ENCOUNTER
Caller: Luiza Melendrez    Relationship: Self    Best call back number: [phone]     What medication are you requesting: ALBUTEROL INHALER 90 MCG 2 PUFFS BY MOUTH EVERY 4 HOURS AS NEEDED     What are your current symptoms: WHEEZING/COUGH    How long have you been experiencing symptoms: PATIENT WENT TO [facility] IN DECEMBER AND WAS GIVEN AN INHALER AND SHE WOULD LIKE ANOTHER     Have you had these symptoms before:    [x] Yes  [] No    Have you been treated for these symptoms before:   [x] Yes  [] No    If a prescription is needed, what is your preferred pharmacy and phone number: [id number] - [facility] [address] [facility] - [phone] [facility] [phone] FX     Additional notes: SHE DOESN'T KNOW HOW MUCH IS LEFT

## 2024-02-22 ENCOUNTER — OFFICE VISIT (OUTPATIENT)
Dept: FAMILY MEDICINE CLINIC | Facility: CLINIC | Age: 69
End: 2024-02-22
Payer: MEDICARE

## 2024-02-22 VITALS
OXYGEN SATURATION: 96 % | HEART RATE: 76 BPM | RESPIRATION RATE: 18 BRPM | BODY MASS INDEX: 36.32 KG/M2 | SYSTOLIC BLOOD PRESSURE: 128 MMHG | DIASTOLIC BLOOD PRESSURE: 78 MMHG | WEIGHT: 218 LBS | HEIGHT: 65 IN

## 2024-02-22 DIAGNOSIS — R06.2 WHEEZING: ICD-10-CM

## 2024-02-22 DIAGNOSIS — R07.89 RIGHT-SIDED CHEST WALL PAIN: Primary | ICD-10-CM

## 2024-02-22 PROCEDURE — 99214 OFFICE O/P EST MOD 30 MIN: CPT | Performed by: FAMILY MEDICINE

## 2024-02-22 PROCEDURE — 3074F SYST BP LT 130 MM HG: CPT | Performed by: FAMILY MEDICINE

## 2024-02-22 PROCEDURE — 1159F MED LIST DOCD IN RCRD: CPT | Performed by: FAMILY MEDICINE

## 2024-02-22 PROCEDURE — 3078F DIAST BP <80 MM HG: CPT | Performed by: FAMILY MEDICINE

## 2024-02-22 PROCEDURE — 1160F RVW MEDS BY RX/DR IN RCRD: CPT | Performed by: FAMILY MEDICINE

## 2024-02-22 RX ORDER — PREDNISONE 20 MG/1
40 TABLET ORAL DAILY
Qty: 10 TABLET | Refills: 0 | Status: SHIPPED | OUTPATIENT
Start: 2024-02-22 | End: 2024-02-27

## 2024-02-22 RX ORDER — BUDESONIDE 90 UG/1
2 AEROSOL, POWDER RESPIRATORY (INHALATION)
Qty: 1 EACH | Refills: 1 | Status: SHIPPED | OUTPATIENT
Start: 2024-02-22

## 2024-03-04 RX ORDER — LISINOPRIL 5 MG/1
5 TABLET ORAL DAILY
Qty: 90 TABLET | Refills: 1 | Status: SHIPPED | OUTPATIENT
Start: 2024-03-04

## 2024-03-25 RX ORDER — METOPROLOL TARTRATE 50 MG/1
TABLET, FILM COATED ORAL
Qty: 270 TABLET | Refills: 0 | Status: SHIPPED | OUTPATIENT
Start: 2024-03-25

## 2024-04-03 NOTE — PROGRESS NOTES
RM:________     PCP: Rufino Foster MD    : 1955  AGE: 69 y.o.  EST PATIENT     REASON FOR VISIT/  CC:        BP Readings from Last 3 Encounters:   24 128/78   23 128/70   23 132/80      Wt Readings from Last 3 Encounters:   24 98.9 kg (218 lb)   23 100 kg (221 lb)   23 103 kg (227 lb)        WT: ____________ BP: __________L __________R HR______    CHEST PAIN: _____________    SOA: _____________PALPS: _______________     LIGHTHEADED: ___________FATIGUE: ________________ EDEMA __________    ALLERGIES:Aspartame, Erythromycin, Hydrocodone-acetaminophen, Morphine and related, and Oxycodone-acetaminophen SMOKING HISTORY:  Social History     Tobacco Use    Smoking status: Never    Smokeless tobacco: Never    Tobacco comments:     caffeine use / tea/ 6 CUPS DAILY    Vaping Use    Vaping status: Never Used   Substance Use Topics    Alcohol use: Yes     Alcohol/week: 2.0 standard drinks of alcohol     Types: 1 Glasses of wine, 1 Shots of liquor per week     Comment: couple drinks a month    Drug use: No     CAFFEINE USE_________________  ALCOHOL ______________________

## 2024-04-10 ENCOUNTER — OFFICE VISIT (OUTPATIENT)
Dept: CARDIOLOGY | Facility: CLINIC | Age: 69
End: 2024-04-10
Payer: MEDICARE

## 2024-04-10 VITALS — OXYGEN SATURATION: 97 % | SYSTOLIC BLOOD PRESSURE: 120 MMHG | DIASTOLIC BLOOD PRESSURE: 78 MMHG | HEART RATE: 66 BPM

## 2024-04-10 DIAGNOSIS — I10 ESSENTIAL HYPERTENSION: ICD-10-CM

## 2024-04-10 DIAGNOSIS — I48.0 PAF (PAROXYSMAL ATRIAL FIBRILLATION): Primary | ICD-10-CM

## 2024-04-10 DIAGNOSIS — Z98.890 STATUS POST ABLATION OF ATRIAL FLUTTER: ICD-10-CM

## 2024-04-10 DIAGNOSIS — Z86.79 STATUS POST ABLATION OF ATRIAL FLUTTER: ICD-10-CM

## 2024-04-10 PROCEDURE — 99214 OFFICE O/P EST MOD 30 MIN: CPT | Performed by: INTERNAL MEDICINE

## 2024-04-10 PROCEDURE — 93000 ELECTROCARDIOGRAM COMPLETE: CPT | Performed by: INTERNAL MEDICINE

## 2024-04-10 RX ORDER — IBUPROFEN 200 MG
200 TABLET ORAL EVERY 6 HOURS PRN
COMMUNITY

## 2024-04-10 RX ORDER — FLECAINIDE ACETATE 100 MG/1
TABLET ORAL
Qty: 30 TABLET | Refills: 1 | Status: SHIPPED | OUTPATIENT
Start: 2024-04-10

## 2024-04-10 NOTE — PROGRESS NOTES
Date of Office Visit: 04/10/2024  Encounter Provider: Dylan Stanton MD  Place of Service: [facility]  Patient Name: Luiza Melendrez  :1955    Chief Complaint   Patient presents with    Follow-up     1 year   :   HPI: Luiza Melendrez is a 69 y.o. female who presents today in follow up. I have reviewed prior notes and there are no changes except for any new updates described below. I have also reviewed any information entered into the medical record by the patient or by ancillary staff.     She presented with atrial flutter in 2013 and underwent successful ablation. In 2016, she went to the ED with palpitations and was found to be in atrial fibrillation and quickly converted to sinus rhythm. She was seen in our office soon thereafter and had an echocardiogram which was normal except for mild-moderate left atrial dilation. She was placed on metoprolol, and was not initially anticoagulated due to her own concerns as well as the fact that she needed a colonscopy. Eventually, she did agree to OAC, and she was started on rivaroxaban.     In May 2019, she reported intermittent palpitations and some dyspnea; a stress echo was normal. In 2023, she had an open cholecystectomy at [facility]; there were no cardiac complications while she was in the hospital but she had a bad weekend with atrial fibrillation after she got home.  She required several extra doses of metoprolol for a few days.     While her overall frequency of atrial fibrillation remains very low, the episodes are becoming more frequent and lasting longer.  She may go several months between episodes or she may only go a few weeks.  Other than the episodes of atrial fibrillation, she is not having any worrisome symptoms.      Past Medical History:   Diagnosis Date    Atrial fibrillation     Atrial flutter 2013    s/p ablation 2013    Bladder cancer     Bladder mass 2022    Bladder polyp      "Edema of lower extremity     left; chronic    Esophageal reflux disease     Gallbladder attack     IN FEB/2022    GERD (gastroesophageal reflux disease)     Hypertension     Loose, teeth     \"due to shorter than normal root length\"    Malignant neoplasm of dome of urinary bladder 10/5/2022    Obesity, Class I, BMI 30-34.9     Obstructive sleep apnea     - wearing CPAP machine     Obstructive sleep apnea, adult     Osteoarthritis     PAF (paroxysmal atrial fibrillation)     Pain of cervical spine     INTERMITTENT    PONV (postoperative nausea and vomiting)     Post-menopausal     Pulsatile tinnitus of both ears     Seasonal allergies     Vitamin D deficiency        Past Surgical History:   Procedure Laterality Date    BUNIONECTOMY      CARDIAC ABLATION  12/2013    for atrial flutter    COLONOSCOPY N/A 10/18/2016    Procedure: COLONOSCOPY into cecum and terminal ileum.  with polypectomy;  Surgeon: Eusebio Cartagena MD;  Location: [facility] ENDOSCOPY;  Service:     HYSTERECTOMY  2012    WITH ANTERIOR REPAIR    REPLACEMENT TOTAL KNEE Right 05/14/2013    REPLACEMENT TOTAL KNEE Left 11/05/2013    TRANSURETHRAL RESECTION OF BLADDER TUMOR N/A 10/5/2022    Procedure: TRANSURETHRAL RESECTION OF BLADDER TUMOR;  Surgeon: Keith King MD;  Location: [facility] MAIN OR;  Service: Urology;  Laterality: N/A;    TUBAL ABDOMINAL LIGATION         Social History     Socioeconomic History    Marital status:    Tobacco Use    Smoking status: Never    Smokeless tobacco: Never    Tobacco comments:     caffeine use / tea/ 6 CUPS DAILY    Vaping Use    Vaping status: Never Used   Substance and Sexual Activity    Alcohol use: Yes     Alcohol/week: 2.0 standard drinks of alcohol     Types: 1 Glasses of wine, 1 Shots of liquor per week     Comment: couple drinks a month    Drug use: No    Sexual activity: Defer       Family History   Problem Relation Age of Onset    Atrial fibrillation Mother     Cancer Father         bladder     " Valvular heart disease Father         aortic valve replacement    Malig Hyperthermia Neg Hx        Review of Systems   Constitutional: Negative for malaise/fatigue.   Cardiovascular:  Positive for leg swelling and palpitations.   Respiratory:  Positive for snoring.    Musculoskeletal:  Positive for joint pain.   Neurological:  Negative for light-headedness and numbness.   All other systems reviewed and are negative.      Allergies   Allergen Reactions    Aspartame Hives    Erythromycin Nausea And Vomiting    Hydrocodone-Acetaminophen Nausea And Vomiting    Morphine And Related Nausea And Vomiting    Oxycodone-Acetaminophen Nausea And Vomiting         Current Outpatient Medications:     Cholecalciferol (VITAMIN D PO), Take 1 tablet by mouth Every Morning., Disp: , Rfl:     famotidine (PEPCID) 20 MG tablet, Take 1 tablet by mouth Daily., Disp: , Rfl:     ibuprofen (Advil) 200 MG tablet, Take 1 tablet by mouth Every 6 (Six) Hours As Needed for Mild Pain., Disp: , Rfl:     lisinopril (PRINIVIL,ZESTRIL) 5 MG tablet, TAKE 1 TABLET BY MOUTH DAILY, Disp: 90 tablet, Rfl: 1    metoprolol tartrate (LOPRESSOR) 50 MG tablet, TAKE 1 TABLET BY MOUTH EVERY MORNING AND 2 TABLETS EVERY EVENING, Disp: 270 tablet, Rfl: 0    rivaroxaban (Xarelto) 20 MG tablet, TAKE 1 TABLET BY MOUTH EVERY DAY, Disp: 90 tablet, Rfl: 1    zolpidem (AMBIEN) 5 MG tablet, Take 1 tablet by mouth At Night As Needed for Sleep., Disp: 30 tablet, Rfl: 1    flecainide (TAMBOCOR) 100 MG tablet, Take one tablet BID as needed (up to two doses max in 24 hours) for atrial fibrillation, Disp: 30 tablet, Rfl: 1     Objective:     Vitals:    04/10/24 1054   BP: 120/78   Pulse: 66   SpO2: 97%     There is no height or weight on file to calculate BMI.    Physical Exam  Vitals reviewed.   Constitutional:       General: She is not in acute distress.     Appearance: She is well-developed. She is obese.   HENT:      Head: Normocephalic.      Nose: Nose normal.   Eyes:       Conjunctiva/sclera: Conjunctivae normal.   Neck:      Vascular: No JVD.   Cardiovascular:      Rate and Rhythm: Normal rate and regular rhythm.      Pulses: Normal pulses and intact distal pulses.      Heart sounds: Normal heart sounds. No murmur heard.  Pulmonary:      Effort: Pulmonary effort is normal.      Breath sounds: Normal breath sounds.   Abdominal:      Palpations: Abdomen is soft.      Tenderness: There is no abdominal tenderness.   Musculoskeletal:         General: Normal range of motion.      Cervical back: Normal range of motion.   Lymphadenopathy:      Cervical: No cervical adenopathy.   Skin:     General: Skin is warm and dry.   Neurological:      General: No focal deficit present.      Mental Status: She is alert.   Psychiatric:         Mood and Affect: Mood normal.           ECG 12 Lead    Date/Time: 4/10/2024 11:08 AM  Performed by: Dylan Stanton MD    Authorized by: Dylan Stanton MD  Comparison: compared with previous ECG   Similar to previous ECG  Rhythm: sinus rhythm  Conduction: conduction normal  ST Segments: ST segments normal  T Waves: T waves normal  QRS axis: normal  Other: no other findings    Clinical impression: normal ECG           Assessment:       Diagnosis Plan   1. PAF (paroxysmal atrial fibrillation)  ECG 12 Lead    Adult Transthoracic Echo Complete W/ Cont if Necessary Per Protocol      2. Status post ablation of atrial flutter        3. Essential hypertension           Plan:       1/2.  Atrial Fibrillation and Atrial Flutter  Assessment   The patient has paroxysmal atrial fibrillation   This is non-valvular in etiology   The patient's CHADS2-VASc score is 3   A [id number] score of 2 or more is considered a high risk for a thromboembolic event   Rivaroxaban prescribed    Plan   Attempt to maintain sinus rhythm   Continue rivaroxaban for antithrombotic therapy, bleeding issues discussed   Continue beta blocker for rhythm control    Subjective - Objective   The average  duration of atrial fibrillation episodes is <48 hours      We talked about our options for her breakthrough episodes.  While her overall burden still remains low, her symptoms are becoming a bit harder to break.  I would like to repeat an echocardiogram to rule out significant LVH, and as long as I do not see any, she will use flecainide as needed.  I did go ahead and send in 100 mg but I asked her not to pick it up until she hears back from me about her echocardiogram results.    2.  Her BP is well controlled.     Sincerely,       Dylan Stanton MD

## 2024-04-24 ENCOUNTER — HOSPITAL ENCOUNTER (OUTPATIENT)
Dept: CARDIOLOGY | Facility: HOSPITAL | Age: 69
Discharge: HOME | End: 2024-04-24
Admitting: INTERNAL MEDICINE
Payer: MEDICARE

## 2024-04-24 VITALS
HEART RATE: 69 BPM | HEIGHT: 65 IN | SYSTOLIC BLOOD PRESSURE: 150 MMHG | WEIGHT: 218 LBS | DIASTOLIC BLOOD PRESSURE: 80 MMHG | BODY MASS INDEX: 36.32 KG/M2

## 2024-04-24 DIAGNOSIS — I48.0 PAF (PAROXYSMAL ATRIAL FIBRILLATION): ICD-10-CM

## 2024-04-24 LAB
AORTIC ARCH: 2.8 CM
ASCENDING AORTA: 2.9 CM
BH CV ECHO MEAS - ACS: 2.09 CM
BH CV ECHO MEAS - AO MAX PG: 9 MMHG
BH CV ECHO MEAS - AO MEAN PG: 5 MMHG
BH CV ECHO MEAS - AO ROOT DIAM: 3 CM
BH CV ECHO MEAS - AO V2 MAX: 150 CM/SEC
BH CV ECHO MEAS - AO V2 VTI: 33.1 CM
BH CV ECHO MEAS - AVA(I,D): 2.18 CM2
BH CV ECHO MEAS - EDV(CUBED): 97.3 ML
BH CV ECHO MEAS - EDV(MOD-SP2): 71 ML
BH CV ECHO MEAS - EDV(MOD-SP4): 82 ML
BH CV ECHO MEAS - EF(MOD-BP): 60.9 %
BH CV ECHO MEAS - EF(MOD-SP2): 60.6 %
BH CV ECHO MEAS - EF(MOD-SP4): 61 %
BH CV ECHO MEAS - ESV(CUBED): 24.8 ML
BH CV ECHO MEAS - ESV(MOD-SP2): 28 ML
BH CV ECHO MEAS - ESV(MOD-SP4): 32 ML
BH CV ECHO MEAS - FS: 36.6 %
BH CV ECHO MEAS - IVS/LVPW: 1 CM
BH CV ECHO MEAS - IVSD: 1 CM
BH CV ECHO MEAS - LAT PEAK E' VEL: 9.6 CM/SEC
BH CV ECHO MEAS - LV DIASTOLIC VOL/BSA (35-75): 40 CM2
BH CV ECHO MEAS - LV MASS(C)D: 158.8 GRAMS
BH CV ECHO MEAS - LV MAX PG: 4.4 MMHG
BH CV ECHO MEAS - LV MEAN PG: 2 MMHG
BH CV ECHO MEAS - LV SYSTOLIC VOL/BSA (12-30): 15.6 CM2
BH CV ECHO MEAS - LV V1 MAX: 105 CM/SEC
BH CV ECHO MEAS - LV V1 VTI: 23.3 CM
BH CV ECHO MEAS - LVIDD: 4.6 CM
BH CV ECHO MEAS - LVIDS: 2.9 CM
BH CV ECHO MEAS - LVOT AREA: 3.1 CM2
BH CV ECHO MEAS - LVOT DIAM: 1.98 CM
BH CV ECHO MEAS - LVPWD: 1 CM
BH CV ECHO MEAS - MED PEAK E' VEL: 6.5 CM/SEC
BH CV ECHO MEAS - MR MAX PG: 35.4 MMHG
BH CV ECHO MEAS - MR MAX VEL: 297.5 CM/SEC
BH CV ECHO MEAS - MV A DUR: 0.13 SEC
BH CV ECHO MEAS - MV A MAX VEL: 87.8 CM/SEC
BH CV ECHO MEAS - MV DEC SLOPE: 356.2 CM/SEC2
BH CV ECHO MEAS - MV DEC TIME: 0.24 SEC
BH CV ECHO MEAS - MV E MAX VEL: 87 CM/SEC
BH CV ECHO MEAS - MV E/A: 0.99
BH CV ECHO MEAS - MV MAX PG: 4.1 MMHG
BH CV ECHO MEAS - MV MEAN PG: 2.01 MMHG
BH CV ECHO MEAS - MV P1/2T: 85.4 MSEC
BH CV ECHO MEAS - MV V2 VTI: 35 CM
BH CV ECHO MEAS - MVA(P1/2T): 2.6 CM2
BH CV ECHO MEAS - MVA(VTI): 2.06 CM2
BH CV ECHO MEAS - PA ACC TIME: 0.15 SEC
BH CV ECHO MEAS - PA V2 MAX: 84.2 CM/SEC
BH CV ECHO MEAS - PULM A REVS DUR: 0.11 SEC
BH CV ECHO MEAS - PULM A REVS VEL: 24.4 CM/SEC
BH CV ECHO MEAS - PULM DIAS VEL: 34.3 CM/SEC
BH CV ECHO MEAS - PULM S/D: 1.41
BH CV ECHO MEAS - PULM SYS VEL: 48.4 CM/SEC
BH CV ECHO MEAS - QP/QS: 0.83
BH CV ECHO MEAS - RAP SYSTOLE: 3 MMHG
BH CV ECHO MEAS - RV MAX PG: 1.86 MMHG
BH CV ECHO MEAS - RV V1 MAX: 68.1 CM/SEC
BH CV ECHO MEAS - RV V1 VTI: 17.3 CM
BH CV ECHO MEAS - RVOT DIAM: 2.1 CM
BH CV ECHO MEAS - RVSP: 25 MMHG
BH CV ECHO MEAS - SUP REN AO DIAM: 2.1 CM
BH CV ECHO MEAS - SV(LVOT): 72.1 ML
BH CV ECHO MEAS - SV(MOD-SP2): 43 ML
BH CV ECHO MEAS - SV(MOD-SP4): 50 ML
BH CV ECHO MEAS - SV(RVOT): 59.7 ML
BH CV ECHO MEAS - SVI(LVOT): 35.1 ML/M2
BH CV ECHO MEAS - SVI(MOD-SP2): 21 ML/M2
BH CV ECHO MEAS - SVI(MOD-SP4): 24.4 ML/M2
BH CV ECHO MEAS - TAPSE (>1.6): 2.17 CM
BH CV ECHO MEAS - TR MAX PG: 21.7 MMHG
BH CV ECHO MEAS - TR MAX VEL: 232.8 CM/SEC
BH CV ECHO MEASUREMENTS AVERAGE E/E' RATIO: 10.81
BH CV XLRA - RV BASE: 3.1 CM
BH CV XLRA - RV LENGTH: 6.4 CM
BH CV XLRA - RV MID: 2.7 CM
BH CV XLRA - TDI S': 15.7 CM/SEC
LEFT ATRIUM VOLUME INDEX: 35.6 ML/M2
SINUS: 3.1 CM
STJ: 2.6 CM

## 2024-04-24 PROCEDURE — 93306 TTE W/DOPPLER COMPLETE: CPT

## 2024-05-16 DIAGNOSIS — I48.0 PAF (PAROXYSMAL ATRIAL FIBRILLATION): ICD-10-CM

## 2024-05-17 RX ORDER — RIVAROXABAN 20 MG/1
TABLET, FILM COATED ORAL
Qty: 90 TABLET | Refills: 1 | Status: SHIPPED | OUTPATIENT
Start: 2024-05-17

## 2024-06-24 RX ORDER — METOPROLOL TARTRATE 50 MG/1
TABLET, FILM COATED ORAL
Qty: 270 TABLET | Refills: 0 | Status: SHIPPED | OUTPATIENT
Start: 2024-06-24

## 2024-07-02 ENCOUNTER — OFFICE VISIT (OUTPATIENT)
Dept: SLEEP MEDICINE | Facility: HOSPITAL | Age: 69
End: 2024-07-02
Payer: MEDICARE

## 2024-07-02 VITALS — OXYGEN SATURATION: 99 % | HEIGHT: 65 IN | HEART RATE: 79 BPM | WEIGHT: 222 LBS | BODY MASS INDEX: 36.99 KG/M2

## 2024-07-02 DIAGNOSIS — G47.33 OSA ON CPAP: ICD-10-CM

## 2024-07-02 DIAGNOSIS — E66.01 CLASS 2 SEVERE OBESITY DUE TO EXCESS CALORIES WITH SERIOUS COMORBIDITY AND BODY MASS INDEX (BMI) OF 36.0 TO 36.9 IN ADULT: Primary | ICD-10-CM

## 2024-07-02 PROCEDURE — 99213 OFFICE O/P EST LOW 20 MIN: CPT | Performed by: INTERNAL MEDICINE

## 2024-07-02 PROCEDURE — 1159F MED LIST DOCD IN RCRD: CPT | Performed by: INTERNAL MEDICINE

## 2024-07-02 PROCEDURE — 1160F RVW MEDS BY RX/DR IN RCRD: CPT | Performed by: INTERNAL MEDICINE

## 2024-07-02 PROCEDURE — G0463 HOSPITAL OUTPT CLINIC VISIT: HCPCS

## 2024-07-02 NOTE — PROGRESS NOTES
"Follow Up [facility] Note     Chief Complaint:  SCAR     Primary Care Physician: Rufino Foster MD    Interval History:   The patient is a 69 y.o. female who was last seen in the [facility] 6/21/2023 and that note was reviewed.  The patient reports she is doing well without new complaints.  She goes to bed between midnight and 2 AM and gets out of bed between 8 and 10 AM.  She will use the restroom during that time.    Review of Systems:    A complete review of systems was done and all were negative with the exception of some anxiety    Social History:    Social History     Socioeconomic History    Marital status:    Tobacco Use    Smoking status: Never    Smokeless tobacco: Never    Tobacco comments:     caffeine use / tea/ 6 CUPS DAILY    Vaping Use    Vaping status: Never Used   Substance and Sexual Activity    Alcohol use: Yes     Alcohol/week: 2.0 standard drinks of alcohol     Types: 1 Glasses of wine, 1 Shots of liquor per week     Comment: couple drinks a month    Drug use: No    Sexual activity: Defer       Allergies:  Aspartame, Erythromycin, Hydrocodone-acetaminophen, Morphine and codeine, and Oxycodone-acetaminophen     Medication Review: Her list was reviewed.      Vital Signs:    Vitals:    07/02/24 0926   Pulse: 79   SpO2: 99%   Weight: 101 kg (222 lb)   Height: 165.1 cm (65\")     Body mass index is 36.94 kg/m².    Physical Exam:    Constitutional:  Well developed 69 y.o. female that appears in no apparent distress.  Awake & oriented times 3.  Normal mood with normal recent and remote memory and normal judgement.  Eyes:  Conjunctivae normal.  Oropharynx: Previously, moist mucous membranes without exudate and a decreased posterior pharyngeal opening.    Self-administered [place] Sleepiness Scale test results: 4  0-5 Lower normal daytime sleepiness  6-10 Higher normal daytime sleepiness  11-12 Mild, 13-15 Moderate, & 16-24 Severe excessive daytime " sleepiness     Downloaded PAP Data Evaluated For Therapeutic Response and Compliance:  DME is [employer] and she uses nasal pillows.  Downloads between 4/3 and 7/1/2024 compliance 100%.  Average usage is 7 hours and 8 minutes.  Average AHI is normal without leak.  ResMed auto CPAP pressure is set at 9    I have reviewed the above results and compared them with the patient's last downloads and reviewed with the patient.    Impression:   Severe obstructive sleep apnea by overnight polysomnogram 8/19/2015, weight 196 pounds, adequately treated with ResMed CPAP set at 9.  Downloads demonstrate the patient to be at goal with good compliance and usage.  The patient has no complaints of hypersomnolence.    Plan:  Good sleep hygiene measures should be maintained.  Weight loss would be beneficial in this patient who has class II severe obesity by Body mass index is 36.94 kg/m².      After evaluating the patient and assessing results available, the patient is benefiting from the treatment being provided.     The patient will continue ResMed auto CPAP set at +9.  Potential side effects of not using PAP therapy reviewed and addressed as needed.  After clinical evaluation and review of downloads, I recommend no changes to the patient's pressures.  A new prescription will be sent to the patient's DME.    I answered all of the patient's questions.  The patient will call the Sleep Disorder Center for any problems and will follow up in 1 year.      Vladislav Hanna MD  Sleep Medicine  07/02/24  09:31 EDT

## 2024-07-05 PROBLEM — G47.33 OSA ON CPAP: Status: ACTIVE | Noted: 2024-07-05

## 2024-09-03 RX ORDER — LISINOPRIL 5 MG/1
5 TABLET ORAL DAILY
Qty: 90 TABLET | Refills: 1 | Status: SHIPPED | OUTPATIENT
Start: 2024-09-03

## 2024-09-12 RX ORDER — METOPROLOL TARTRATE 50 MG
TABLET ORAL
Qty: 270 TABLET | Refills: 0 | OUTPATIENT
Start: 2024-09-12

## 2024-09-16 RX ORDER — METOPROLOL TARTRATE 50 MG
TABLET ORAL
Qty: 90 TABLET | Refills: 0 | Status: SHIPPED | OUTPATIENT
Start: 2024-09-16

## 2024-09-16 RX ORDER — METOPROLOL TARTRATE 50 MG
TABLET ORAL
Qty: 90 TABLET | Refills: 0 | Status: SHIPPED | OUTPATIENT
Start: 2024-09-16 | End: 2024-09-16

## 2024-09-17 RX ORDER — METOPROLOL TARTRATE 50 MG
TABLET ORAL
Qty: 270 TABLET | OUTPATIENT
Start: 2024-09-17

## 2024-10-02 ENCOUNTER — OFFICE VISIT (OUTPATIENT)
Dept: FAMILY MEDICINE CLINIC | Facility: CLINIC | Age: 69
End: 2024-10-02
Payer: MEDICARE

## 2024-10-02 VITALS
WEIGHT: 229.1 LBS | SYSTOLIC BLOOD PRESSURE: 158 MMHG | RESPIRATION RATE: 16 BRPM | HEART RATE: 66 BPM | DIASTOLIC BLOOD PRESSURE: 90 MMHG | HEIGHT: 65 IN | OXYGEN SATURATION: 99 % | BODY MASS INDEX: 38.17 KG/M2

## 2024-10-02 DIAGNOSIS — F43.22 ADJUSTMENT DISORDER WITH ANXIETY: ICD-10-CM

## 2024-10-02 DIAGNOSIS — R60.0 PERIPHERAL EDEMA: ICD-10-CM

## 2024-10-02 DIAGNOSIS — I10 ESSENTIAL HYPERTENSION: Primary | ICD-10-CM

## 2024-10-02 PROCEDURE — 3077F SYST BP >= 140 MM HG: CPT | Performed by: FAMILY MEDICINE

## 2024-10-02 PROCEDURE — 99213 OFFICE O/P EST LOW 20 MIN: CPT | Performed by: FAMILY MEDICINE

## 2024-10-02 PROCEDURE — 1159F MED LIST DOCD IN RCRD: CPT | Performed by: FAMILY MEDICINE

## 2024-10-02 PROCEDURE — 3080F DIAST BP >= 90 MM HG: CPT | Performed by: FAMILY MEDICINE

## 2024-10-02 PROCEDURE — 1160F RVW MEDS BY RX/DR IN RCRD: CPT | Performed by: FAMILY MEDICINE

## 2024-10-02 RX ORDER — TRIAMCINOLONE ACETONIDE 1 MG/G
CREAM TOPICAL
COMMUNITY
Start: 2024-07-30

## 2024-10-02 RX ORDER — NYSTATIN 100000 U/G
CREAM TOPICAL
COMMUNITY
Start: 2024-07-30

## 2024-10-02 NOTE — PROGRESS NOTES
"Chief Complaint  Fatigue, Leg Swelling (Swelling YADIEL lower legs x 1 week.), Hypertension, and Stress (Anxiety due to recent move and situation surrounding that)    Subjective    History of Present Illness    Luiza Melendrez presents to [facility] for Fatigue, Leg Swelling (Swelling YADIEL lower legs x 1 week.), Hypertension, and Stress (Anxiety due to recent move and situation surrounding that).  History of Present Illness     Here today with concerns as above. Has been experiencing the symptoms for the last few weeks. Stress has been quite high as she has been moving and is dealing with ongoing family stress surrounding inheritance. Has noticed that her blood pressure has been creeping up, wondering if this contributes. Swelling of the lower extremities has been more noticeable over the past week or so. Does have a history of this and wears compression stockings at times. Has only ever been on a low-dose of lisinopril previously.    Objective     Vital Signs:   /90   Pulse 66   Resp 16   Ht 165.1 cm (65\")   Wt 104 kg (229 lb 1.6 oz)   SpO2 99%   BMI 38.12 kg/m²   Physical Exam  Vitals and nursing note reviewed.   Constitutional:       General: She is not in acute distress.     Appearance: Normal appearance. She is not ill-appearing.   Cardiovascular:      Rate and Rhythm: Normal rate and regular rhythm.      Pulses: Normal pulses.      Heart sounds: Normal heart sounds. No murmur heard.  Pulmonary:      Effort: Pulmonary effort is normal. No respiratory distress.      Breath sounds: Normal breath sounds. No rales.   Musculoskeletal:      Right lower le+ Pitting Edema present.      Left lower le+ Pitting Edema present.   Neurological:      Mental Status: She is alert and oriented to person, place, and time. Mental status is at baseline.   Psychiatric:         Mood and Affect: Mood normal.         Behavior: Behavior normal.                 Assessment and Plan "   Diagnoses and all orders for this visit:    1. Essential hypertension (Primary)    2. Peripheral edema    3. Adjustment disorder with anxiety    Discussed various symptoms at length. I would recommend that she increase her lisinopril to 10 mg and see how she does over the next couple weeks. I think this may actually improve a number of her symptoms. Sounds as if she is having some adjustment disorder with anxiety, would anticipate this improving with decreased stressors going forward. I will check in with her in a month and we can check some labs.    Recommended follow up as below. Encouraged communication via [employer] in the meantime.     Patient was given instructions and counseling regarding her condition or for health maintenance advice. Please see specific information pulled into the AVS (placed there by myself) if appropriate.    Return in about 1 month (around 11/2/2024), or if symptoms worsen or fail to improve, for f/u HTN.    MARILYN Foster MD

## 2024-10-17 RX ORDER — METOPROLOL TARTRATE 50 MG
TABLET ORAL
Qty: 90 TABLET | Refills: 0 | Status: SHIPPED | OUTPATIENT
Start: 2024-10-17

## 2024-10-21 DIAGNOSIS — I10 ESSENTIAL HYPERTENSION: Primary | ICD-10-CM

## 2024-10-25 NOTE — PROGRESS NOTES
RM:________     PCP: Rufino Foster MD    : 1955  AGE: 69 y.o.  EST PATIENT     REASON FOR VISIT/  CC:        BP Readings from Last 3 Encounters:   10/02/24 158/90   24 150/80   04/10/24 120/78      Wt Readings from Last 3 Encounters:   10/02/24 104 kg (229 lb 1.6 oz)   24 101 kg (222 lb)   24 98.9 kg (218 lb)        WT: ____________ BP: __________L __________R HR______    CHEST PAIN: _____________    SOA: _____________PALPS: _______________     LIGHTHEADED: ___________FATIGUE: ________________ EDEMA __________    ALLERGIES:Aspartame, Erythromycin, Hydrocodone-acetaminophen, Morphine and codeine, and Oxycodone-acetaminophen SMOKING HISTORY:  Social History     Tobacco Use    Smoking status: Never    Smokeless tobacco: Never    Tobacco comments:     caffeine use / tea/ 6 CUPS DAILY    Vaping Use    Vaping status: Never Used   Substance Use Topics    Alcohol use: Yes     Alcohol/week: 2.0 standard drinks of alcohol     Types: 1 Glasses of wine, 1 Shots of liquor per week     Comment: couple drinks a month    Drug use: No     CAFFEINE USE_________________  ALCOHOL ______________________

## 2024-10-29 LAB
ALBUMIN SERPL-MCNC: 3.9 G/DL (ref 3.5–5.2)
ALBUMIN/GLOB SERPL: 2.1 G/DL
ALP SERPL-CCNC: 59 U/L (ref 39–117)
ALT SERPL-CCNC: 17 U/L (ref 1–33)
AST SERPL-CCNC: 14 U/L (ref 1–32)
BILIRUB SERPL-MCNC: 0.4 MG/DL (ref 0–1.2)
BUN SERPL-MCNC: 16 MG/DL (ref 8–23)
BUN/CREAT SERPL: 16.7 (ref 7–25)
CALCIUM SERPL-MCNC: 9.1 MG/DL (ref 8.6–10.5)
CHLORIDE SERPL-SCNC: 108 MMOL/L (ref 98–107)
CO2 SERPL-SCNC: 27.7 MMOL/L (ref 22–29)
CREAT SERPL-MCNC: 0.96 MG/DL (ref 0.57–1)
EGFRCR SERPLBLD CKD-EPI 2021: 63.8 ML/MIN/1.73
GLOBULIN SER CALC-MCNC: 1.9 GM/DL
GLUCOSE SERPL-MCNC: 95 MG/DL (ref 65–99)
POTASSIUM SERPL-SCNC: 4.4 MMOL/L (ref 3.5–5.2)
PROT SERPL-MCNC: 5.8 G/DL (ref 6–8.5)
SODIUM SERPL-SCNC: 143 MMOL/L (ref 136–145)

## 2024-11-07 ENCOUNTER — OFFICE VISIT (OUTPATIENT)
Dept: CARDIOLOGY | Facility: CLINIC | Age: 69
End: 2024-11-07
Payer: MEDICARE

## 2024-11-07 VITALS
WEIGHT: 225.2 LBS | SYSTOLIC BLOOD PRESSURE: 140 MMHG | BODY MASS INDEX: 37.52 KG/M2 | HEART RATE: 97 BPM | DIASTOLIC BLOOD PRESSURE: 100 MMHG | HEIGHT: 65 IN

## 2024-11-07 DIAGNOSIS — I48.0 PAF (PAROXYSMAL ATRIAL FIBRILLATION): Primary | ICD-10-CM

## 2024-11-07 DIAGNOSIS — I10 ESSENTIAL HYPERTENSION: ICD-10-CM

## 2024-11-07 DIAGNOSIS — R13.14 PHARYNGOESOPHAGEAL DYSPHAGIA: ICD-10-CM

## 2024-11-07 DIAGNOSIS — Z86.79 STATUS POST ABLATION OF ATRIAL FLUTTER: ICD-10-CM

## 2024-11-07 DIAGNOSIS — Z98.890 STATUS POST ABLATION OF ATRIAL FLUTTER: ICD-10-CM

## 2024-11-07 PROCEDURE — 1159F MED LIST DOCD IN RCRD: CPT | Performed by: INTERNAL MEDICINE

## 2024-11-07 PROCEDURE — 3080F DIAST BP >= 90 MM HG: CPT | Performed by: INTERNAL MEDICINE

## 2024-11-07 PROCEDURE — 3077F SYST BP >= 140 MM HG: CPT | Performed by: INTERNAL MEDICINE

## 2024-11-07 PROCEDURE — 1160F RVW MEDS BY RX/DR IN RCRD: CPT | Performed by: INTERNAL MEDICINE

## 2024-11-07 PROCEDURE — 99214 OFFICE O/P EST MOD 30 MIN: CPT | Performed by: INTERNAL MEDICINE

## 2024-11-07 RX ORDER — LISINOPRIL AND HYDROCHLOROTHIAZIDE 12.5; 2 MG/1; MG/1
1 TABLET ORAL DAILY
Qty: 90 TABLET | Refills: 3 | Status: SHIPPED | OUTPATIENT
Start: 2024-11-07

## 2024-11-07 RX ORDER — FLECAINIDE ACETATE 50 MG/1
50 TABLET ORAL 2 TIMES DAILY
Qty: 180 TABLET | Refills: 3 | Status: SHIPPED | OUTPATIENT
Start: 2024-11-07

## 2024-11-07 NOTE — LETTER
2024     Rufino Foster MD  [address] [place] [id number]    Patient: Luiza Melendrez   YOB: 1955   Date of Visit: 2024       Dear Rufino Foster MD    Luiza Melendrez was in my office today. Below is a copy of my note.    If you have questions, please do not hesitate to call me. I look forward to following Luiza along with you.         Sincerely,        Dylan Stanton MD        CC: No Recipients    RM:________     PCP: Rufino Foster MD    : 1955  AGE: 69 y.o.  EST PATIENT     REASON FOR VISIT/  CC:        BP Readings from Last 3 Encounters:   10/02/24 158/90   24 150/80   04/10/24 120/78      Wt Readings from Last 3 Encounters:   10/02/24 104 kg (229 lb 1.6 oz)   24 101 kg (222 lb)   24 98.9 kg (218 lb)        WT: ____________ BP: __________L __________R HR______    CHEST PAIN: _____________    SOA: _____________PALPS: _______________     LIGHTHEADED: ___________FATIGUE: ________________ EDEMA __________    ALLERGIES:Aspartame, Erythromycin, Hydrocodone-acetaminophen, Morphine and codeine, and Oxycodone-acetaminophen SMOKING HISTORY:  Social History     Tobacco Use   • Smoking status: Never   • Smokeless tobacco: Never   • Tobacco comments:     caffeine use / tea/ 6 CUPS DAILY    Vaping Use   • Vaping status: Never Used   Substance Use Topics   • Alcohol use: Yes     Alcohol/week: 2.0 standard drinks of alcohol     Types: 1 Glasses of wine, 1 Shots of liquor per week     Comment: couple drinks a month   • Drug use: No     CAFFEINE USE_________________  ALCOHOL ______________________      Date of Office Visit: 04/10/2024  Encounter Provider: Dylan Stanton MD  Place of Service: [facility] CARDIOLOGY  Patient Name: Luiza Melendrez  :1955    Chief Complaint   Patient presents with   • Follow-up   • Atrial Fibrillation   :   HPI: Luiza Melendrez is a 69 y.o. female who presents today in follow  "up. I have reviewed prior notes and there are no changes except for any new updates described below. I have also reviewed any information entered into the medical record by the patient or by ancillary staff.     She presented with atrial flutter in November 2013 and underwent successful ablation. In October 2016, she went to the ED with palpitations and was found to be in atrial fibrillation and quickly converted to sinus rhythm. She was seen in our office soon thereafter and had an echocardiogram which was normal except for mild-moderate left atrial dilation. She was placed on metoprolol, and was not initially anticoagulated due to her own concerns as well as the fact that she needed a colonscopy. Eventually, she did agree to OAC, and she was started on rivaroxaban.     In May 2019, she reported intermittent palpitations and some dyspnea; a stress echo was normal. In June 2023, she had an open cholecystectomy at [facility]; there were no cardiac complications while she was in the hospital but she had a bad weekend with atrial fibrillation after she got home.  She required several extra doses of metoprolol for a few days.     At our last visit, I added PRN flecainide as her episodes were increasing in duration and frequency. It usually helps, but they continue to increase in frequency and duration. She feels terrible when she's in AF. Also, her BP is poorly controlled. She doesn't have CP. She's fatigued and winded.    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   • Obesity, Class I, BMI " 30-34.9    • Obstructive sleep apnea     - wearing CPAP machine    • Obstructive sleep apnea, adult    • Osteoarthritis    • PAF (paroxysmal atrial fibrillation)    • Pain of cervical spine     INTERMITTENT   • PONV (postoperative nausea and vomiting)    • Post-menopausal    • Pulsatile tinnitus of both ears    • Seasonal allergies    • Vitamin D deficiency        Past Surgical History:   Procedure Laterality Date   • BUNIONECTOMY     • CARDIAC ABLATION  12/2013    for atrial flutter   • COLONOSCOPY N/A 10/18/2016    Procedure: COLONOSCOPY into cecum and terminal ileum.  with polypectomy;  Surgeon: Eusebio Cartagena MD;  Location: [facility] ENDOSCOPY;  Service:    • HYSTERECTOMY  2012    WITH ANTERIOR REPAIR   • REPLACEMENT TOTAL KNEE Right 05/14/2013   • REPLACEMENT TOTAL KNEE Left 11/05/2013   • TRANSURETHRAL RESECTION OF BLADDER TUMOR N/A 10/5/2022    Procedure: TRANSURETHRAL RESECTION OF BLADDER TUMOR;  Surgeon: Keith King MD;  Location: [facility] MAIN OR;  Service: Urology;  Laterality: N/A;   • TUBAL ABDOMINAL LIGATION         Social History     Socioeconomic History   • Marital status:    Tobacco Use   • Smoking status: Never   • Smokeless tobacco: Never   • Tobacco comments:     caffeine use / tea/ 6 CUPS DAILY    Vaping Use   • Vaping status: Never Used   Substance and Sexual Activity   • Alcohol use: Yes     Alcohol/week: 2.0 standard drinks of alcohol     Types: 1 Glasses of wine, 1 Shots of liquor per week     Comment: couple drinks a month   • Drug use: No   • Sexual activity: Defer       Family History   Problem Relation Age of Onset   • Atrial fibrillation Mother    • Cancer Father         bladder    • Valvular heart disease Father         aortic valve replacement   • Malig Hyperthermia Neg Hx        Review of Systems   Constitutional: Positive for malaise/fatigue.   Cardiovascular:  Positive for dyspnea on exertion, leg swelling and palpitations.   Respiratory:  Positive for snoring.   "  Musculoskeletal:  Positive for joint pain.   Neurological:  Negative for light-headedness and numbness.   All other systems reviewed and are negative.      Allergies   Allergen Reactions   • Aspartame Hives   • Erythromycin Nausea And Vomiting   • Hydrocodone-Acetaminophen Nausea And Vomiting   • Morphine And Codeine Nausea And Vomiting   • Oxycodone-Acetaminophen Nausea And Vomiting         Current Outpatient Medications:   •  Cholecalciferol (VITAMIN D PO), Take 1 tablet by mouth Every Morning., Disp: , Rfl:   •  famotidine (PEPCID) 20 MG tablet, Take 1 tablet by mouth Daily., Disp: , Rfl:   •  flecainide (TAMBOCOR) 50 MG tablet, Take 1 tablet by mouth 2 (Two) Times a Day. Take one tablet BID as needed (up to two doses max in 24 hours) for atrial fibrillation, Disp: 180 tablet, Rfl: 3  •  ibuprofen (Advil) 200 MG tablet, Take 1 tablet by mouth Every 6 (Six) Hours As Needed for Mild Pain., Disp: , Rfl:   •  metoprolol tartrate (LOPRESSOR) 50 MG tablet, TAKE 1 TABLET BY MOUTH EVERY MORNING AND 2 TABLETS EVERY EVENING, Disp: 90 tablet, Rfl: 0  •  nystatin (MYCOSTATIN) 859583 UNIT/GM cream, Apply  topically to the appropriate area as directed., Disp: , Rfl:   •  triamcinolone (KENALOG) 0.1 % cream, Apply  topically to the appropriate area as directed., Disp: , Rfl:   •  Xarelto 20 MG tablet, TAKE 1 TABLET BY MOUTH EVERY DAY, Disp: 90 tablet, Rfl: 1  •  zolpidem (AMBIEN) 5 MG tablet, Take 1 tablet by mouth At Night As Needed for Sleep., Disp: 30 tablet, Rfl: 1  •  lisinopril-hydrochlorothiazide (PRINZIDE,ZESTORETIC) 20-12.5 MG per tablet, Take 1 tablet by mouth Daily., Disp: 90 tablet, Rfl: 3     Objective:     Vitals:    11/07/24 1037   BP: 140/100   BP Location: Left arm   Patient Position: Sitting   Pulse: 97   Weight: 102 kg (225 lb 3.2 oz)   Height: 165.1 cm (65\")     Body mass index is 37.48 kg/m².    Physical Exam  Vitals reviewed.   Constitutional:       General: She is not in acute distress.     Appearance: " She is well-developed. She is obese.   HENT:      Head: Normocephalic.      Nose: Nose normal.   Eyes:      Conjunctiva/sclera: Conjunctivae normal.   Neck:      Vascular: No JVD.   Cardiovascular:      Rate and Rhythm: Normal rate. Rhythm irregular.      Pulses: Normal pulses and intact distal pulses.      Heart sounds: Normal heart sounds. No murmur heard.  Pulmonary:      Effort: Pulmonary effort is normal.      Breath sounds: Normal breath sounds.   Abdominal:      Palpations: Abdomen is soft.      Tenderness: There is no abdominal tenderness.   Musculoskeletal:         General: Normal range of motion.      Cervical back: Normal range of motion.   Lymphadenopathy:      Cervical: No cervical adenopathy.   Skin:     General: Skin is warm and dry.   Neurological:      General: No focal deficit present.      Mental Status: She is alert.   Psychiatric:         Mood and Affect: Mood normal.         Procedures     Assessment:       Diagnosis Plan   1. PAF (paroxysmal atrial fibrillation)        2. Status post ablation of atrial flutter        3. Pharyngoesophageal dysphagia  FL ESOPHAGRAM SINGLE CONTRAST      4. Essential hypertension          Plan:       1/2.  Atrial Fibrillation and Atrial Flutter  Assessment  • The patient has paroxysmal atrial fibrillation  • This is non-valvular in etiology  • The patient's CHADS2-VASc score is 3  • A [id number] score of 2 or more is considered a high risk for a thromboembolic event  • Rivaroxaban prescribed    Plan  • Attempt to maintain sinus rhythm  • Continue rivaroxaban for antithrombotic therapy, bleeding issues discussed  • Continue beta blocker for rhythm control  • Add flecainide for rhythm control    Subjective - Objective  • The average duration of atrial fibrillation episodes is <48 hours      Her AF episodes continue to increase in duration and frequency.  She is in atrial fibrillation at the moment.  Unfortunately, she is not sure if she has missed any doses of  rivaroxaban in the last 4 weeks.  I considered doing a NOLVIA cardioversion but she reports choking after swallowing.  I have ordered an esophagram.  I have asked her to take flecainide 50 mg twice daily in addition to metoprolol.  I will bring her back for an EKG next week.  I asked her not to miss any doses of rivaroxaban.    3.  Her BP is poorly controlled. I stopped plain lisinopril and started lisinopril-HCTZ 20-12.5mg daily.    Sincerely,       Dylan Stanton MD

## 2024-11-08 NOTE — PROGRESS NOTES
Date of Office Visit: 04/10/2024  Encounter Provider: Dylan Stanton MD  Place of Service: [facility] CARDIOLOGY  Patient Name: Luiaz Melendrez  :1955    Chief Complaint   Patient presents with    Follow-up    Atrial Fibrillation   :   HPI: Luiza Melendrez is a 69 y.o. female who presents today in follow up. I have reviewed prior notes and there are no changes except for any new updates described below. I have also reviewed any information entered into the medical record by the patient or by ancillary staff.     She presented with atrial flutter in 2013 and underwent successful ablation. In 2016, she went to the ED with palpitations and was found to be in atrial fibrillation and quickly converted to sinus rhythm. She was seen in our office soon thereafter and had an echocardiogram which was normal except for mild-moderate left atrial dilation. She was placed on metoprolol, and was not initially anticoagulated due to her own concerns as well as the fact that she needed a colonscopy. Eventually, she did agree to OAC, and she was started on rivaroxaban.     In May 2019, she reported intermittent palpitations and some dyspnea; a stress echo was normal. In 2023, she had an open cholecystectomy at [facility]; there were no cardiac complications while she was in the hospital but she had a bad weekend with atrial fibrillation after she got home.  She required several extra doses of metoprolol for a few days.     At our last visit, I added PRN flecainide as her episodes were increasing in duration and frequency. It usually helps, but they continue to increase in frequency and duration. She feels terrible when she's in AF. Also, her BP is poorly controlled. She doesn't have CP. She's fatigued and winded.    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  with polypectomy;  Surgeon: Eusebio Cartagena MD;  Location: [facility] ENDOSCOPY;  Service:     HYSTERECTOMY  2012    WITH ANTERIOR REPAIR    REPLACEMENT TOTAL KNEE Right 05/14/2013    REPLACEMENT TOTAL KNEE Left 11/05/2013    TRANSURETHRAL RESECTION OF BLADDER TUMOR N/A 10/5/2022    Procedure: TRANSURETHRAL RESECTION OF BLADDER TUMOR;  Surgeon: Keith King MD;  Location: [facility] MAIN OR;  Service: Urology;  Laterality: N/A;    TUBAL ABDOMINAL LIGATION         Social History     Socioeconomic History    Marital status:    Tobacco Use    Smoking status: Never    Smokeless tobacco: Never    Tobacco comments:     caffeine use / tea/ 6 CUPS DAILY    Vaping Use    Vaping status: Never Used   Substance and Sexual Activity    Alcohol use: Yes     Alcohol/week: 2.0 standard drinks of alcohol     Types: 1 Glasses of wine, 1 Shots of liquor per week     Comment: couple drinks a month    Drug use: No    Sexual activity: Defer       Family History   Problem Relation Age of Onset    Atrial fibrillation Mother     Cancer Father         bladder  "    Valvular heart disease Father         aortic valve replacement    Malig Hyperthermia Neg Hx        Review of Systems   Constitutional: Positive for malaise/fatigue.   Cardiovascular:  Positive for dyspnea on exertion, leg swelling and palpitations.   Respiratory:  Positive for snoring.    Musculoskeletal:  Positive for joint pain.   Neurological:  Negative for light-headedness and numbness.   All other systems reviewed and are negative.      Allergies   Allergen Reactions    Aspartame Hives    Erythromycin Nausea And Vomiting    Hydrocodone-Acetaminophen Nausea And Vomiting    Morphine And Codeine Nausea And Vomiting    Oxycodone-Acetaminophen Nausea And Vomiting         Current Outpatient Medications:     Cholecalciferol (VITAMIN D PO), Take 1 tablet by mouth Every Morning., Disp: , Rfl:     famotidine (PEPCID) 20 MG tablet, Take 1 tablet by mouth Daily., Disp: , Rfl:     flecainide (TAMBOCOR) 50 MG tablet, Take 1 tablet by mouth 2 (Two) Times a Day. Take one tablet BID as needed (up to two doses max in 24 hours) for atrial fibrillation, Disp: 180 tablet, Rfl: 3    ibuprofen (Advil) 200 MG tablet, Take 1 tablet by mouth Every 6 (Six) Hours As Needed for Mild Pain., Disp: , Rfl:     metoprolol tartrate (LOPRESSOR) 50 MG tablet, TAKE 1 TABLET BY MOUTH EVERY MORNING AND 2 TABLETS EVERY EVENING, Disp: 90 tablet, Rfl: 0    nystatin (MYCOSTATIN) 876087 UNIT/GM cream, Apply  topically to the appropriate area as directed., Disp: , Rfl:     triamcinolone (KENALOG) 0.1 % cream, Apply  topically to the appropriate area as directed., Disp: , Rfl:     Xarelto 20 MG tablet, TAKE 1 TABLET BY MOUTH EVERY DAY, Disp: 90 tablet, Rfl: 1    zolpidem (AMBIEN) 5 MG tablet, Take 1 tablet by mouth At Night As Needed for Sleep., Disp: 30 tablet, Rfl: 1    lisinopril-hydrochlorothiazide (PRINZIDE,ZESTORETIC) 20-12.5 MG per tablet, Take 1 tablet by mouth Daily., Disp: 90 tablet, Rfl: 3     Objective:     Vitals:    11/07/24 1037   BP:  "140/100   BP Location: Left arm   Patient Position: Sitting   Pulse: 97   Weight: 102 kg (225 lb 3.2 oz)   Height: 165.1 cm (65\")     Body mass index is 37.48 kg/m².    Physical Exam  Vitals reviewed.   Constitutional:       General: She is not in acute distress.     Appearance: She is well-developed. She is obese.   HENT:      Head: Normocephalic.      Nose: Nose normal.   Eyes:      Conjunctiva/sclera: Conjunctivae normal.   Neck:      Vascular: No JVD.   Cardiovascular:      Rate and Rhythm: Normal rate. Rhythm irregular.      Pulses: Normal pulses and intact distal pulses.      Heart sounds: Normal heart sounds. No murmur heard.  Pulmonary:      Effort: Pulmonary effort is normal.      Breath sounds: Normal breath sounds.   Abdominal:      Palpations: Abdomen is soft.      Tenderness: There is no abdominal tenderness.   Musculoskeletal:         General: Normal range of motion.      Cervical back: Normal range of motion.   Lymphadenopathy:      Cervical: No cervical adenopathy.   Skin:     General: Skin is warm and dry.   Neurological:      General: No focal deficit present.      Mental Status: She is alert.   Psychiatric:         Mood and Affect: Mood normal.         Procedures     Assessment:       Diagnosis Plan   1. PAF (paroxysmal atrial fibrillation)        2. Status post ablation of atrial flutter        3. Pharyngoesophageal dysphagia  FL ESOPHAGRAM SINGLE CONTRAST      4. Essential hypertension           Plan:       1/2.  Atrial Fibrillation and Atrial Flutter  Assessment   The patient has paroxysmal atrial fibrillation   This is non-valvular in etiology   The patient's CHADS2-VASc score is 3   A PHX1YQ2-VJRy score of 2 or more is considered a high risk for a thromboembolic event   Rivaroxaban prescribed    Plan   Attempt to maintain sinus rhythm   Continue rivaroxaban for antithrombotic therapy, bleeding issues discussed   Continue beta blocker for rhythm control   Add flecainide for rhythm " control    Subjective - Objective   The average duration of atrial fibrillation episodes is <48 hours      Her AF episodes continue to increase in duration and frequency.  She is in atrial fibrillation at the moment.  Unfortunately, she is not sure if she has missed any doses of rivaroxaban in the last 4 weeks.  I considered doing a NOLVIA cardioversion but she reports choking after swallowing.  I have ordered an esophagram.  I have asked her to take flecainide 50 mg twice daily in addition to metoprolol.  I will bring her back for an EKG next week.  I asked her not to miss any doses of rivaroxaban.    3.  Her BP is poorly controlled. I stopped plain lisinopril and started lisinopril-HCTZ 20-12.5mg daily.    Sincerely,       Dylan Stanton MD

## 2024-11-11 ENCOUNTER — CLINICAL SUPPORT (OUTPATIENT)
Dept: CARDIOLOGY | Facility: CLINIC | Age: 69
End: 2024-11-11
Payer: MEDICARE

## 2024-11-11 VITALS
WEIGHT: 225 LBS | DIASTOLIC BLOOD PRESSURE: 86 MMHG | HEIGHT: 65 IN | SYSTOLIC BLOOD PRESSURE: 128 MMHG | HEART RATE: 61 BPM | BODY MASS INDEX: 37.49 KG/M2

## 2024-11-11 DIAGNOSIS — I48.0 PAF (PAROXYSMAL ATRIAL FIBRILLATION): Primary | ICD-10-CM

## 2024-11-11 PROCEDURE — 93000 ELECTROCARDIOGRAM COMPLETE: CPT | Performed by: INTERNAL MEDICINE

## 2024-11-11 RX ORDER — LOPERAMIDE HYDROCHLORIDE 2 MG/1
2 CAPSULE ORAL 4 TIMES DAILY PRN
COMMUNITY

## 2024-11-11 NOTE — Clinical Note
Please make f/u appt with me in six months, please make new patient Ep appointment with Dr PRAVEEN garner

## 2024-11-11 NOTE — PROGRESS NOTES
Patient presented to the clinic for EKG and BP check.  Verified and updated patient allergies and medication.  Patient did not c/o symptoms.    BP in office was 128/86 and HR was 61.    Presented patient to Maximino Richardson MD.  Provider stated that patient all looks good and continue what she is doing.  Patient was notified of the above.  Patient verbalized understanding.    ISREAL Young

## 2024-11-11 NOTE — PROGRESS NOTES
Procedure     ECG 12 Lead    Date/Time: 11/11/2024 12:58 PM  Performed by: Dylan Stanton MD    Authorized by: Dylan Stanton MD  Comparison: compared with previous ECG   Comparison to previous ECG: SR replaces AF  Rhythm: sinus rhythm  Conduction: conduction normal  ST Segments: ST segments normal  T Waves: T waves normal  QRS axis: normal  Other: no other findings    Clinical impression: normal ECG

## 2024-11-14 NOTE — PROGRESS NOTES
[facility] CARDIOLOGY  Clinical Cardiac Electrophysiology     Date of consultation: 11/20/2024  Referring Provider: Dylan Stanton MD  [facility] [facility] [address] [address]     Reason for consultation: Paroxysmal atrial fibrillation      History of Presenting Illness     OFFICE VISIT  Luiza Melendrez is a 69 y.o. female with a past medical history of atrial flutter (November 2013) s/p ablation, history of atrial fibrillation who presents for new patient evaluation.     Her atrial fibrillation was initially diagnosed in 2016 after she went to the emergency room with palpitations.  Subsequently started on rivaroxaban.  2019 she had some additional intermittent palpitations.    In 2023 June, she had open cholecystectomy at [facility].  She noted that after she got home, she had atrial fibrillation and required several extra dose of metoprolol for several days.    She was recently seen in 11/7/2024.    Earlier in the year, she had flecainide added as her episodes were increasing in duration and frequency.  despite this, they continued increase in frequency and duration.  When she is in atrial fibrillation, she feels generally poor with significant fatigue and shortness of breath on exertion.     Today, patient notes that she is actually doing relatively well.  She says for the past week and a half she has barely had any episodes of her atrial fibrillation and has not been bothersome.  She also says for this period of time, her blood pressure has been better controlled and she has not had as much anxiety.    She does note that her A-fib tends to get worse with anxiety (such as on election day, she had a very long episode).    Although she denies any history of prior heart attacks, stents, she does note that recently she has been doing a lot of moving heavy boxes up and down stairs as they are moving houses.  In this setting, she has had some decreased exercise tolerance, more dyspnea on  "exertion.     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  with polypectomy;  Surgeon: Eusebio Cartagena MD;  Location: [facility] ENDOSCOPY;  Service:     HYSTERECTOMY  2012    WITH ANTERIOR REPAIR    REPLACEMENT TOTAL KNEE Right 05/14/2013    REPLACEMENT TOTAL KNEE Left 11/05/2013    TRANSURETHRAL RESECTION OF BLADDER TUMOR N/A 10/5/2022    Procedure: TRANSURETHRAL RESECTION OF BLADDER TUMOR;  Surgeon: Keith King MD;  Location: [facility] MAIN OR;  Service: Urology;  Laterality: N/A;    TUBAL ABDOMINAL LIGATION           Medications     Current Outpatient Medications on File Prior to Visit   Medication Sig Dispense Refill    Cholecalciferol (VITAMIN D PO) Take 1 tablet by mouth Every Morning.      famotidine (PEPCID) 20 MG tablet Take 1 tablet by mouth Daily.      flecainide (TAMBOCOR) 50 MG tablet Take 1 tablet by mouth 2 (Two) Times a Day. Take one tablet BID as needed (up to two doses max in " 24 hours) for atrial fibrillation 180 tablet 3    ibuprofen (Advil) 200 MG tablet Take 1 tablet by mouth Every 6 (Six) Hours As Needed for Mild Pain.      lisinopril-hydrochlorothiazide (PRINZIDE,ZESTORETIC) 20-12.5 MG per tablet Take 1 tablet by mouth Daily. 90 tablet 3    loperamide (IMODIUM) 2 MG capsule Take 1 capsule by mouth 4 (Four) Times a Day As Needed for Diarrhea.      metoprolol tartrate (LOPRESSOR) 50 MG tablet TAKE 1 TABLET BY MOUTH EVERY MORNING AND 2 TABLETS EVERY EVENING 270 tablet 0    nystatin (MYCOSTATIN) 643171 UNIT/GM cream Apply  topically to the appropriate area as directed.      triamcinolone (KENALOG) 0.1 % cream Apply  topically to the appropriate area as directed.      Xarelto 20 MG tablet TAKE 1 TABLET BY MOUTH EVERY DAY 90 tablet 1    zolpidem (AMBIEN) 5 MG tablet Take 1 tablet by mouth At Night As Needed for Sleep. 30 tablet 1     No current facility-administered medications on file prior to visit.           Allergies     Allergies   Allergen Reactions    Aspartame Hives    Erythromycin Nausea And Vomiting    Hydrocodone-Acetaminophen Nausea And Vomiting    Morphine And Codeine Nausea And Vomiting    Oxycodone-Acetaminophen Nausea And Vomiting           Family History     Family History   Problem Relation Age of Onset    Atrial fibrillation Mother     Cancer Father         bladder     Valvular heart disease Father         aortic valve replacement    Malig Hyperthermia Neg Hx            Social History     Social History     Socioeconomic History    Marital status:    Tobacco Use    Smoking status: Never    Smokeless tobacco: Never    Tobacco comments:     caffeine use / tea/ 6 CUPS DAILY    Vaping Use    Vaping status: Never Used   Substance and Sexual Activity    Alcohol use: Yes     Alcohol/week: 2.0 standard drinks of alcohol     Types: 1 Glasses of wine, 1 Shots of liquor per week     Comment: couple drinks a month    Drug use: No    Sexual activity: Defer        Review of  "Systems: All systems reviewed. Pertinent positives identified in HPI. All other systems are negative.         Physical Examination     Vitals:    11/20/24 1108   BP: 124/82   Pulse: 62   SpO2: 99%   Weight: 103 kg (226 lb)   Height: 165.1 cm (65\")     Body mass index is 37.61 kg/m².     Gen: Well nourished; Alert  Skin: no visible rashes and no abnormalities with palpation  Eyes: no evidence of visual defects and normal sclera  Ears: no abnormalities.  Mouth: normal teeth and appropriately moist mucous membranes  Chest: clear to auscultation. There is normal respiratory effort and expansion.  CV: examination showed a regular rhythm. S1 and S2 were normal.   Abd: no visible distension.   Neurologic:Cranial nerves appear intact; Sensation normal; no localizing signs        Laboratory Studies (Reviewed by provider)    Lab Results   Component Value Date    WBC 7.46 06/08/2023    HGB 12.2 06/08/2023    HCT 37.1 06/08/2023    MCV 91.2 06/08/2023     06/08/2023     Lab Results   Component Value Date    GLUCOSE 95 10/29/2024    BUN 16 10/29/2024    CO2 27.7 10/29/2024    CREATININE 0.96 10/29/2024    K 4.4 10/29/2024     10/29/2024     (H) 10/29/2024    CALCIUM 9.1 10/29/2024     No results found for: \"MG\"  No results found for: \"PHOS\"  Lab Results   Component Value Date    ALT 17 10/29/2024    AST 14 10/29/2024    ALKPHOS 59 10/29/2024    BILITOT 0.4 10/29/2024     Lab Results   Component Value Date    TRIG 218 (H) 12/05/2023    HDL 37 (L) 12/05/2023     (H) 12/05/2023     No results found for: \"PTT\", \"INR\"  No results found for: \"HGBA1C\"  No results found for: \"TSH\"     Investigations (Reviewed by provider)    EKG 11/20/24:   ECG 12 Lead    Date/Time: 11/20/2024 11:19 AM  Performed by: Rufino Church MD    Authorized by: Rufino Church MD  Comparison: compared with previous ECG from 11/11/2024  Similar to previous ECG  Rhythm: sinus rhythm    Clinical impression: normal ECG          11/11/2024:     "   ECHOCARDIOGRAPHY: 4/24/2024:    Left ventricular systolic function is normal. Left ventricular ejection fraction appears to be 61 - 65%.    Left ventricular diastolic function was normal.    The left atrial cavity is mildly dilated.    Estimated right ventricular systolic pressure from tricuspid regurgitation is normal (<35 mmHg).           Echo stress 4/22/2019:  Rest EF=65%  Left ventricular systolic function is normal.  Mild mitral valve regurgitation is present  STRESS ECHO  Normal stress echo with no significant echocardiographic evidence for myocardial ischemia.  Post EF=75%.        Assessment & Plan    # Atrial fibrillation, paroxysmal, highly symptomatic with fatigue, dyspnea on exertion.  - CHADVASC: [id number] Score: 3  - On anticoagulation with: Xarelto  - Patient currently on flecainide, has been doing well on it without any issues.  - Her last stress test was in 2019, at that time her results were benign.-She does have some increased dyspnea on exertion as of late as she has been moving heavy boxes up and down stairs in the process of moving houses.  - We will obtain a repeat stress test just to ensure there is no new evidence of ischemia or infarct.  - If benign, can continue flecainide.  - Continue metoprolol for now.  - I discussed with her other treatments for A-fib including additional medical management/ablation.  Patient wishes to continue current therapy at this time.         Follow up in 6 months   Plan: Patient wishes to continue current therapy at this time.  Obtain stress to ensure no new ischemia/infarct in the setting of flecainide treatment.     As always, Dr. Stanton, it has been a pleasure to participate in your patient's care. We will continue to follow the patient in our clinic.     This time spent caring for Luiza Melendrez on this date of service includes time spent by me in the following activities:preparing for the visit, reviewing tests, obtaining and/or reviewing a separately  obtained history, performing a medically appropriate examination and/or evaluation, counseling and educating the patient/family/caregiver, documenting information in the medical record, and independently interpreting results and communicating that information with the patient/family/caregiver    Rufino Church MD  Clinical Cardiac Electrophysiology  [facility]

## 2024-11-19 ENCOUNTER — TELEPHONE (OUTPATIENT)
Dept: FAMILY MEDICINE CLINIC | Facility: CLINIC | Age: 69
End: 2024-11-19

## 2024-11-19 DIAGNOSIS — I48.0 PAF (PAROXYSMAL ATRIAL FIBRILLATION): ICD-10-CM

## 2024-11-19 RX ORDER — METOPROLOL TARTRATE 50 MG
TABLET ORAL
Qty: 270 TABLET | Refills: 0 | Status: SHIPPED | OUTPATIENT
Start: 2024-11-19

## 2024-11-19 RX ORDER — RIVAROXABAN 20 MG/1
TABLET, FILM COATED ORAL
Qty: 90 TABLET | Refills: 1 | Status: SHIPPED | OUTPATIENT
Start: 2024-11-19

## 2024-11-19 NOTE — TELEPHONE ENCOUNTER
Caller: Luiza Melendrez    Relationship: Self    Best call back number: [phone]      What was the call regarding: PATIENT CALLING WANTED TO CHANGE REFILL TO QUANTITY   INSTEAD OF 90 DAY REFILL FOR     metoprolol tartrate (LOPRESSOR) 50 MG tablet     PATIENT NEEDS MEDICATION DUE TO SHE WILL BE OUT AS OF TONIGHT     PATIENT STATES SHE IS NOT RUNNING TO PHARMACY EVERY MONTH     PATIENT WANTS A 3 MONTH SUPPLY ON ALL HER MEDICATIONS       [id number] - [place], [place] - [address] [facility] - [phone] [facility] [phone] FX     PLEASE GIVE CALLBACK WITH ANY QUESTIONS

## 2024-11-20 ENCOUNTER — OFFICE VISIT (OUTPATIENT)
Age: 69
End: 2024-11-20
Payer: MEDICARE

## 2024-11-20 VITALS
DIASTOLIC BLOOD PRESSURE: 82 MMHG | SYSTOLIC BLOOD PRESSURE: 124 MMHG | BODY MASS INDEX: 37.65 KG/M2 | OXYGEN SATURATION: 99 % | HEART RATE: 62 BPM | HEIGHT: 65 IN | WEIGHT: 226 LBS

## 2024-11-20 DIAGNOSIS — R06.09 DOE (DYSPNEA ON EXERTION): ICD-10-CM

## 2024-11-20 DIAGNOSIS — I48.0 PAF (PAROXYSMAL ATRIAL FIBRILLATION): Primary | ICD-10-CM

## 2024-12-03 ENCOUNTER — TELEPHONE (OUTPATIENT)
Age: 69
End: 2024-12-03
Payer: MEDICARE

## 2024-12-03 NOTE — TELEPHONE ENCOUNTER
Pt called and has questions regarding her visit.  Pt is scheduled for a stress test on Mon.  Pt thought that she would be discussing an ablation and would like to take to you regarding your next steps if her stress test is benign, will she be a canidate for an ablation.  Pt number is [phone].        Note:  She also had questions about her upcoming stress test.  I answered questions within my scope and also sent the nuclear dept a email to call her.

## 2024-12-04 DIAGNOSIS — I48.0 PAF (PAROXYSMAL ATRIAL FIBRILLATION): Primary | ICD-10-CM

## 2024-12-04 NOTE — PROGRESS NOTES
Spoke with patient this morning at 10:15 AM.  Patient is some questions about ablation.    I discussed that ablation would be a good option for her as she has paroxysmal atrial fibrillation and is highly symptomatic with palpitations and general discomfort.    Described the risk of A-fib ablation, including approximately 1% risk of heart attack, stroke, injury to the heart/lungs/esophagus/blood vessels. Described that the risk of severe injury/illness/death due to the procedure is very small.    Utilizing shared decision making with patient, patient elects to proceed with elective atrial fibrillation ablation.    She did inquire about the stress test.  I described ideally because she is taking flecainide, we want a make sure that she has not developed any new ischemia.    I discussed that we will call her after we get the stress test results and talk with neck steps, but I anticipate we will be to move forward with the ablation.    I will have scheduling reach out to her to schedule a date for ablation.

## 2024-12-06 ENCOUNTER — TELEPHONE (OUTPATIENT)
Dept: CARDIOLOGY | Facility: CLINIC | Age: 69
End: 2024-12-06
Payer: MEDICARE

## 2024-12-09 ENCOUNTER — HOSPITAL ENCOUNTER (OUTPATIENT)
Dept: CARDIOLOGY | Facility: HOSPITAL | Age: 69
Discharge: HOME | End: 2024-12-09
Payer: MEDICARE

## 2024-12-09 VITALS — BODY MASS INDEX: 37.83 KG/M2 | WEIGHT: 227.07 LBS | HEIGHT: 65 IN

## 2024-12-09 DIAGNOSIS — R06.09 DOE (DYSPNEA ON EXERTION): ICD-10-CM

## 2024-12-09 DIAGNOSIS — I48.0 PAF (PAROXYSMAL ATRIAL FIBRILLATION): ICD-10-CM

## 2024-12-09 LAB
BH CV NUCLEAR PRIOR STUDY: 2
BH CV REST NUCLEAR ISOTOPE DOSE: 10.6 MCI
BH CV STRESS BP STAGE 1: NORMAL
BH CV STRESS COMMENTS STAGE 1: NORMAL
BH CV STRESS DOSE REGADENOSON STAGE 1: 0.4
BH CV STRESS DURATION MIN STAGE 1: 0
BH CV STRESS DURATION SEC STAGE 1: 10
BH CV STRESS HR STAGE 1: 85
BH CV STRESS NUCLEAR ISOTOPE DOSE: 35.6 MCI
BH CV STRESS PROTOCOL 1: NORMAL
BH CV STRESS RECOVERY BP: NORMAL MMHG
BH CV STRESS RECOVERY HR: 74 BPM
BH CV STRESS STAGE 1: 1
LV EF NUC BP: 68 %
MAXIMAL PREDICTED HEART RATE: 150 BPM
PERCENT MAX PREDICTED HR: 56.67 %
STRESS BASELINE BP: NORMAL MMHG
STRESS BASELINE HR: 58 BPM
STRESS PERCENT HR: 67 %
STRESS POST EXERCISE DUR SEC: 10 SEC
STRESS POST PEAK BP: NORMAL MMHG
STRESS POST PEAK HR: 85 BPM
STRESS TARGET HR: 128 BPM

## 2024-12-09 PROCEDURE — 25010000002 REGADENOSON 0.4 MG/5ML SOLUTION: Performed by: STUDENT IN AN ORGANIZED HEALTH CARE EDUCATION/TRAINING PROGRAM

## 2024-12-09 PROCEDURE — A9502 TC99M TETROFOSMIN: HCPCS | Performed by: STUDENT IN AN ORGANIZED HEALTH CARE EDUCATION/TRAINING PROGRAM

## 2024-12-09 PROCEDURE — 78452 HT MUSCLE IMAGE SPECT MULT: CPT

## 2024-12-09 PROCEDURE — 34310000005 TECHNETIUM TETROFOSMIN KIT: Performed by: STUDENT IN AN ORGANIZED HEALTH CARE EDUCATION/TRAINING PROGRAM

## 2024-12-09 PROCEDURE — 93017 CV STRESS TEST TRACING ONLY: CPT

## 2024-12-09 RX ORDER — REGADENOSON 0.08 MG/ML
0.4 INJECTION, SOLUTION INTRAVENOUS
Status: COMPLETED | OUTPATIENT
Start: 2024-12-09 | End: 2024-12-09

## 2024-12-09 RX ADMIN — REGADENOSON 0.4 MG: 0.08 INJECTION, SOLUTION INTRAVENOUS at 10:07

## 2024-12-09 RX ADMIN — TETROFOSMIN 1 DOSE: 1.38 INJECTION, POWDER, LYOPHILIZED, FOR SOLUTION INTRAVENOUS at 10:07

## 2024-12-09 RX ADMIN — TETROFOSMIN 1 DOSE: 1.38 INJECTION, POWDER, LYOPHILIZED, FOR SOLUTION INTRAVENOUS at 09:15

## 2024-12-10 ENCOUNTER — TELEPHONE (OUTPATIENT)
Age: 69
End: 2024-12-10
Payer: MEDICARE

## 2024-12-10 NOTE — TELEPHONE ENCOUNTER
I spoke with the patient 12/10/2024 at around 11:10 AM.  Noted that her stress test came back as low risk I discussed with her that she would be a good candidate for ablation she still chooses.  Patient notes that she will think about it some and she will call the office if she wishes to schedule.

## 2024-12-20 DIAGNOSIS — G47.09 OTHER INSOMNIA: ICD-10-CM

## 2024-12-20 RX ORDER — ZOLPIDEM TARTRATE 5 MG/1
5 TABLET ORAL NIGHTLY PRN
Qty: 30 TABLET | Refills: 1 | Status: SHIPPED | OUTPATIENT
Start: 2024-12-20

## 2024-12-20 NOTE — TELEPHONE ENCOUNTER
Caller: Luiza Melendrez    Relationship: Self    Best call back number: [phone]     Requested Prescriptions:   Requested Prescriptions     Pending Prescriptions Disp Refills    zolpidem (AMBIEN) 5 MG tablet 30 tablet 1     Sig: Take 1 tablet by mouth At Night As Needed for Sleep.        Pharmacy where request should be sent: [facility] [id number] [address] AT [facility].  [phone]  - [phone] FX     Last office visit with prescribing clinician: 10/2/2024   Last telemedicine visit with prescribing clinician: Visit date not found   Next office visit with prescribing clinician: Visit date not found     Additional details provided by patient: OUT    Does the patient have less than a 3 day supply:  [x] Yes  [] No    Would you like a call back once the refill request has been completed: [] Yes [] No    If the office needs to give you a call back, can they leave a voicemail: [] Yes [] No    Flip Rivas   12/20/24 12:31 EST

## 2025-01-23 ENCOUNTER — TELEPHONE (OUTPATIENT)
Dept: FAMILY MEDICINE CLINIC | Facility: CLINIC | Age: 70
End: 2025-01-23

## 2025-01-23 NOTE — TELEPHONE ENCOUNTER
Caller: Aneesh Luiza KODAK     Relationship: SELF    Best call back number: [phone]    What is your medical concern? PATIENT GETS CHOKED APPROX TWICE A  MONTH.    PATIENT STATED THAT IT MAYBE A DRINK OF WATER OR MAYBE THE ADDED PEPPER THAT SHE ADDS TO HER FOOD BUT ITS RANDOM.    FLEXIBLE ESOPHAGRAM WITH SINGLE CONTRAST HAS BEEN ORDERED BY DR MUÑOZ.    PATIENT QUESTIONED IF DR HERNÁNDEZ WAS IN AGREEMENT OR WHAT NEEDED TO BE DONE OTHER THAN THE TEST OR NOTHING TO BE DONE.     How long has this issue been going on? YEARS (FOR PEPPER) OTHER FOODS OR LIQUIDS JUST RECENT FOR THE CHOKING.    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    Is your provider already aware of this issue? NO    Have you been treated for this issue? NO

## 2025-01-27 ENCOUNTER — OFFICE VISIT (OUTPATIENT)
Dept: FAMILY MEDICINE CLINIC | Facility: CLINIC | Age: 70
End: 2025-01-27
Payer: MEDICARE

## 2025-01-27 VITALS
HEIGHT: 65 IN | BODY MASS INDEX: 37.99 KG/M2 | OXYGEN SATURATION: 98 % | RESPIRATION RATE: 18 BRPM | WEIGHT: 228 LBS | DIASTOLIC BLOOD PRESSURE: 80 MMHG | SYSTOLIC BLOOD PRESSURE: 120 MMHG | HEART RATE: 69 BPM

## 2025-01-27 DIAGNOSIS — R13.10 DYSPHAGIA, UNSPECIFIED TYPE: Primary | ICD-10-CM

## 2025-01-27 DIAGNOSIS — I95.9 HYPOTENSIVE EPISODE: ICD-10-CM

## 2025-01-27 PROCEDURE — 1159F MED LIST DOCD IN RCRD: CPT | Performed by: NURSE PRACTITIONER

## 2025-01-27 PROCEDURE — 1160F RVW MEDS BY RX/DR IN RCRD: CPT | Performed by: NURSE PRACTITIONER

## 2025-01-27 PROCEDURE — 99214 OFFICE O/P EST MOD 30 MIN: CPT | Performed by: NURSE PRACTITIONER

## 2025-01-27 PROCEDURE — 3079F DIAST BP 80-89 MM HG: CPT | Performed by: NURSE PRACTITIONER

## 2025-01-27 PROCEDURE — 3074F SYST BP LT 130 MM HG: CPT | Performed by: NURSE PRACTITIONER

## 2025-01-27 NOTE — PROGRESS NOTES
Subjective   Luiza Melendrez is a 69 y.o. female.     Chief Complaint   Patient presents with    Choking     Cardio requesting esophageal test        History of Present Illness     History of Present Illness  The patient presents for evaluation of choking episodes, atrial fibrillation, and hypotension.    She reports frequent choking episodes, primarily triggered by water intake, with occasional instances related to food consumption. She also identifies pepper and chocolate candy as potential triggers. These episodes are characterized by a brief period of strangulation, without any associated cyanosis. She has not sought consultation from a gastroenterologist or an otolaryngologist. She does not experience any vomiting or dyspnea during these episodes but acknowledges a need to cough momentarily. She has been using a CPAP machine for sleep apnea but does not believe it is related to her swallowing difficulties. Her last surgical procedure was a cholecystectomy performed 3 years ago, during which anesthesia was administered via the throat. Advised that she needs to see an ENT or GI doctor, but patient declines at this time.  She agreed if symptoms worsen that she will let us know so that we can refer her.     She experienced a significant drop in blood pressure on Thanksgiving Day, leading to near syncope. She managed to lie down on the floor and instructed her  to prepare for a potential hospital visit. However, she was unable to rise and requested her  to call EMS. By the time EMS arrived, her blood pressure had normalized. This was an isolated incident, and she has not experienced any similar episodes since. She has been on metoprolol for approximately 13 years and does not regularly monitor her blood pressure at home. She has seen cardiology since this time and had a stress test.  BP is normal at visit today.     She has been experiencing arrhythmias, which have increased in duration from 1 hour to  4 to 6 hours. If these occur at night, she takes a sleeping pill as they disrupt her sleep. She can feel the arrhythmias when lying on her left side. She does not believe an ablation is worth it if it is only going to be effective for a year.    MEDICATIONS  Current: Metoprolol, flecainide     The following portions of the patient's history were reviewed and updated as appropriate: allergies, current medications, past family history, past medical history, past social history, past surgical history and problem list.    Review of Systems   Constitutional:  Negative for chills, fatigue and fever.   HENT:  Positive for trouble swallowing.    Respiratory:  Negative for cough, chest tightness, shortness of breath and wheezing.    Cardiovascular:  Negative for chest pain, palpitations and leg swelling.   Gastrointestinal:  Negative for abdominal pain, anal bleeding, blood in stool, diarrhea, nausea, vomiting, GERD and indigestion.   Neurological:  Negative for dizziness and headache.   Hematological: Negative.    Psychiatric/Behavioral: Negative.  Negative for sleep disturbance.        Objective   Physical Exam  Vitals and nursing note reviewed.   Constitutional:       Appearance: She is well-developed.   HENT:      Head: Normocephalic and atraumatic.   Eyes:      Conjunctiva/sclera: Conjunctivae normal.      Pupils: Pupils are equal, round, and reactive to light.   Cardiovascular:      Rate and Rhythm: Normal rate and regular rhythm.      Heart sounds: Normal heart sounds. No murmur heard.  Pulmonary:      Effort: Pulmonary effort is normal.      Breath sounds: Normal breath sounds.   Neurological:      Mental Status: She is alert and oriented to person, place, and time.   Psychiatric:         Behavior: Behavior normal.         Thought Content: Thought content normal.         Judgment: Judgment normal.         Vitals:    01/27/25 1054   BP: 120/80   Pulse: 69   Resp: 18   SpO2: 98%     Body mass index is 37.94  kg/m².      Procedures    Assessment & Plan   Problems Addressed this Visit    None  Visit Diagnoses       Dysphagia, unspecified type    -  Primary    Hypotensive episode              Diagnoses         Codes Comments    Dysphagia, unspecified type    -  Primary ICD-10-CM: R13.10  ICD-9-CM: 787.20     Hypotensive episode     ICD-10-CM: I95.9  ICD-9-CM: 458.9             Assessment & Plan  1. Choking episodes.  She experiences choking episodes primarily with water, pepper, and occasionally chocolate candy. There is no associated cyanosis or vomiting. If the condition worsens or becomes more frequent, she will undergo a swallow study.    2. Atrial fibrillation.  She has been experiencing arrhythmias that last 4-6 hours and are particularly bothersome at night. She has been on metoprolol for over 10 years and was recently prescribed flecainide. She will continue her current medication regimen. If her symptoms worsen, she will inform her cardiologist to consider further interventions.    3. Hypotension.  She experienced a significant drop in blood pressure on Thanksgiving Day, which led to near syncope. She has not had any episodes since then. She is advised to monitor her blood pressure in the morning and evening a few times a week and record the readings. If her blood pressure trends lower, she will inform Dr. Nelson or Dr. Munoz for potential medication adjustment.    PROCEDURE  The patient underwent a cholecystectomy 3 years ago.       Assessment & Plan  Dysphagia, unspecified type         Hypotensive episode                No follow-ups on file.    Patient or patient representative verbalized consent for the use of Ambient Listening during the visit with  RONNIE Sotelo for chart documentation. 1/27/2025  11:07 EST

## 2025-02-17 RX ORDER — METOPROLOL TARTRATE 50 MG
TABLET ORAL
Qty: 270 TABLET | Refills: 0 | Status: SHIPPED | OUTPATIENT
Start: 2025-02-17

## 2025-02-17 NOTE — TELEPHONE ENCOUNTER
Caller: Luiza Melendrez    Relationship: Self    Best call back number: [phone]     Requested Prescriptions:   Requested Prescriptions     Pending Prescriptions Disp Refills    metoprolol tartrate (LOPRESSOR) 50 MG tablet 270 tablet 0     Sig: TAKE 1 TABLET BY MOUTH EVERY MORNING AND 2 TABLETS EVERY EVENING        Pharmacy where request should be sent: [facility] [id number] [address]  [phone]  - [phone] FX     Last office visit with prescribing clinician: 10/2/2024   Last telemedicine visit with prescribing clinician: Visit date not found   Next office visit with prescribing clinician: Visit date not found     Additional details provided by patient: WOULD LIKE A 90 DAY SUPPLY WITH NO CHILD PROOF CAP    Does the patient have less than a 3 day supply:  [x] Yes  [] No    Would you like a call back once the refill request has been completed: [] Yes [] No    If the office needs to give you a call back, can they leave a voicemail: [] Yes [] No    Skyler Herrera Rep   02/17/25 10:34 EST

## 2025-02-17 NOTE — TELEPHONE ENCOUNTER
PATIENT STATED FROM HERE ON ALL MEDICATIONS WILL BE GOING TO      [facility] [id number] - [facility] [address] AT [facility] [place]. - [phone] [facility] [phone]  [phone]

## 2025-05-12 NOTE — PROGRESS NOTES
RM:________     PCP: Rufino Foster MD    : 1955  AGE: 70 y.o.  EST PATIENT           Wt Readings from Last 3 Encounters:   25 103 kg (228 lb)   24 103 kg (227 lb 1.2 oz)   24 103 kg (226 lb)           CP______  SOA_______ DIZZINESS _____ FATIGUE ______  PALPS ______    WT: ____________ BP: __________L __________R HR______    ALLERGIES:Aspartame, Erythromycin, Hydrocodone-acetaminophen, Morphine and codeine, and Oxycodone-acetaminophen      Social History     Tobacco Use    Smoking status: Never    Smokeless tobacco: Never    Tobacco comments:     caffeine use / tea/ 6 CUPS DAILY    Vaping Use    Vaping status: Never Used   Substance Use Topics    Alcohol use: Yes     Alcohol/week: 2.0 standard drinks of alcohol     Types: 1 Glasses of wine, 1 Shots of liquor per week     Comment: couple drinks a month    Drug use: No

## 2025-05-14 ENCOUNTER — OFFICE VISIT (OUTPATIENT)
Dept: CARDIOLOGY | Age: 70
End: 2025-05-14
Payer: MEDICARE

## 2025-05-14 VITALS
SYSTOLIC BLOOD PRESSURE: 116 MMHG | HEIGHT: 65 IN | WEIGHT: 225 LBS | BODY MASS INDEX: 37.49 KG/M2 | HEART RATE: 59 BPM | DIASTOLIC BLOOD PRESSURE: 60 MMHG

## 2025-05-14 DIAGNOSIS — I48.0 PAF (PAROXYSMAL ATRIAL FIBRILLATION): Primary | ICD-10-CM

## 2025-05-14 DIAGNOSIS — Z98.890 STATUS POST ABLATION OF ATRIAL FLUTTER: ICD-10-CM

## 2025-05-14 DIAGNOSIS — Z86.79 STATUS POST ABLATION OF ATRIAL FLUTTER: ICD-10-CM

## 2025-05-14 DIAGNOSIS — I10 ESSENTIAL HYPERTENSION: ICD-10-CM

## 2025-05-14 PROCEDURE — 93000 ELECTROCARDIOGRAM COMPLETE: CPT | Performed by: INTERNAL MEDICINE

## 2025-05-14 PROCEDURE — 1159F MED LIST DOCD IN RCRD: CPT | Performed by: INTERNAL MEDICINE

## 2025-05-14 PROCEDURE — 99214 OFFICE O/P EST MOD 30 MIN: CPT | Performed by: INTERNAL MEDICINE

## 2025-05-14 PROCEDURE — 3078F DIAST BP <80 MM HG: CPT | Performed by: INTERNAL MEDICINE

## 2025-05-14 PROCEDURE — 3074F SYST BP LT 130 MM HG: CPT | Performed by: INTERNAL MEDICINE

## 2025-05-14 PROCEDURE — 1160F RVW MEDS BY RX/DR IN RCRD: CPT | Performed by: INTERNAL MEDICINE

## 2025-05-14 RX ORDER — CLOTRIMAZOLE AND BETAMETHASONE DIPROPIONATE 10; .64 MG/G; MG/G
CREAM TOPICAL DAILY PRN
COMMUNITY
Start: 2025-04-18

## 2025-05-14 NOTE — PROGRESS NOTES
Date of Office Visit: 2025  Encounter Provider: Dylan Stanton MD  Place of Service: [facility] CARDIOLOGY  Patient Name: Luiza Melendrez  :1955    Chief Complaint   Patient presents with    Atrial Fibrillation   :   HPI: Luiza Melendrez is a 70 y.o. female who presents today in follow up. I have reviewed prior notes and there are no changes except for any new updates described below. I have also reviewed any information entered into the medical record by the patient or by ancillary staff.     She presented with atrial flutter in 2013 and underwent successful ablation. In 2016, she went to the ED with palpitations and was found to be in atrial fibrillation and quickly converted to sinus rhythm. She was seen in our office soon thereafter and had an echocardiogram which was normal except for mild-moderate left atrial dilation. She was placed on metoprolol, and was not initially anticoagulated due to her own concerns as well as the fact that she needed a colonscopy. Eventually, she did agree to OAC, and she was started on rivaroxaban.     In May 2019, she reported intermittent palpitations and some dyspnea; a stress echo was normal. In 2023, she had an open cholecystectomy at [facility]; there were no cardiac complications while she was in the hospital but she had a bad weekend with atrial fibrillation after she got home.  She required several extra doses of metoprolol for a few days.     She was evaluated by Dr Church (EP) in 2024; he recommended a perfusion stress test as she reported dyspnea. It was unremarkable. She was placed on BID flecainide.    For the last three months, she's had AF every week to every other week, and the episodes are lasting longer. They used to last ~2 hours and respond to PRN medications, but now they're lasting 6 hours or more, and are not responding as well to PRN medications. Otherwise, her CV status is stable.    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  with polypectomy;  Surgeon: Eusebio Cartagena MD;  Location: [facility] ENDOSCOPY;  Service:     HERNIA REPAIR  12-13    Transartery ablation    HYSTERECTOMY  2012    WITH ANTERIOR REPAIR    JOINT REPLACEMENT  5-13    Rt total knee, left one in 11-13    REPLACEMENT TOTAL KNEE Right 05/14/2013    REPLACEMENT TOTAL KNEE Left 11/05/2013    TRANSURETHRAL RESECTION OF BLADDER TUMOR N/A 10/05/2022    Procedure: TRANSURETHRAL RESECTION OF BLADDER TUMOR;  Surgeon: Keith King MD;  Location: [facility] MAIN OR;  Service: Urology;  Laterality: N/A;    TUBAL ABDOMINAL LIGATION      VEIN SURGERY  2005       Social History     Socioeconomic History    Marital status:    Tobacco Use    Smoking status: Never     Passive exposure: Never    Smokeless tobacco: Never    Tobacco comments:     caffeine use / tea/ 6 CUPS DAILY    Vaping Use    Vaping status: Never Used   Substance and Sexual Activity    Alcohol use: Yes     Alcohol/week: 2.0 standard drinks of alcohol     Types: 1 Glasses of wine, 1 Shots of liquor per week     Comment: couple drinks a month    Drug use:  No    Sexual activity: Defer       Family History   Problem Relation Age of Onset    Atrial fibrillation Mother     Arrhythmia Mother     Cancer Father         bladder     Valvular heart disease Father         aortic valve replacement    Arthritis Father     Malig Hyperthermia Neg Hx        Review of Systems   Constitutional: Positive for malaise/fatigue.   Cardiovascular:  Positive for dyspnea on exertion, leg swelling and palpitations.   Respiratory:  Positive for snoring.    Musculoskeletal:  Positive for joint pain.   Neurological:  Negative for light-headedness and numbness.   All other systems reviewed and are negative.      Allergies   Allergen Reactions    Aspartame Hives    Erythromycin Nausea And Vomiting    Hydrocodone-Acetaminophen Nausea And Vomiting    Morphine And Codeine Nausea And Vomiting    Oxycodone-Acetaminophen Nausea And Vomiting         Current Outpatient Medications:     Cholecalciferol (VITAMIN D PO), Take 1 tablet by mouth Every Morning., Disp: , Rfl:     clotrimazole-betamethasone (LOTRISONE) 1-0.05 % cream, Daily As Needed., Disp: , Rfl:     famotidine (PEPCID) 20 MG tablet, Take 1 tablet by mouth Daily., Disp: , Rfl:     flecainide (TAMBOCOR) 50 MG tablet, Take 1 tablet by mouth 2 (Two) Times a Day. Take one tablet BID as needed (up to two doses max in 24 hours) for atrial fibrillation, Disp: 180 tablet, Rfl: 3    ibuprofen (Advil) 200 MG tablet, Take 1 tablet by mouth Every 6 (Six) Hours As Needed for Mild Pain., Disp: , Rfl:     lisinopril-hydrochlorothiazide (PRINZIDE,ZESTORETIC) 20-12.5 MG per tablet, Take 1 tablet by mouth Daily., Disp: 90 tablet, Rfl: 3    loperamide (IMODIUM) 2 MG capsule, Take 1 capsule by mouth 4 (Four) Times a Day As Needed for Diarrhea., Disp: , Rfl:     metoprolol tartrate (LOPRESSOR) 50 MG tablet, TAKE 1 TABLET BY MOUTH EVERY MORNING AND 2 TABLETS EVERY EVENING, Disp: 270 tablet, Rfl: 0    nystatin (MYCOSTATIN) 446096 UNIT/GM cream, Apply  topically to the  "appropriate area as directed., Disp: , Rfl:     triamcinolone (KENALOG) 0.1 % cream, Apply  topically to the appropriate area as directed., Disp: , Rfl:     Xarelto 20 MG tablet, TAKE 1 TABLET BY MOUTH EVERY DAY, Disp: 90 tablet, Rfl: 1    zolpidem (AMBIEN) 5 MG tablet, Take 1 tablet by mouth At Night As Needed for Sleep., Disp: 30 tablet, Rfl: 1     Objective:     Vitals:    05/14/25 1140   BP: 116/60   BP Location: Left arm   Weight: 102 kg (225 lb)   Height: 165.1 cm (65\")       Body mass index is 37.44 kg/m².    Physical Exam  Vitals reviewed.   Constitutional:       Appearance: She is well-developed.   HENT:      Head: Normocephalic.      Nose: Nose normal.      Mouth/Throat:      Pharynx: Oropharynx is clear.   Eyes:      Conjunctiva/sclera: Conjunctivae normal.   Neck:      Vascular: No JVD.   Cardiovascular:      Rate and Rhythm: Normal rate and regular rhythm.      Pulses: Normal pulses.      Heart sounds: Normal heart sounds.   Pulmonary:      Effort: Pulmonary effort is normal.      Breath sounds: Normal breath sounds.   Abdominal:      Palpations: Abdomen is soft.      Tenderness: There is no abdominal tenderness.   Musculoskeletal:         General: No swelling. Normal range of motion.      Cervical back: Normal range of motion.   Skin:     General: Skin is warm and dry.   Neurological:      General: No focal deficit present.      Mental Status: She is alert.   Psychiatric:         Mood and Affect: Mood normal.         ECG 12 Lead    Date/Time: 5/14/2025 11:56 AM  Performed by: Dylan Stanton MD    Authorized by: Dylan Stanton MD  Comparison: compared with previous ECG   Similar to previous ECG  Rhythm: sinus rhythm  Conduction: conduction normal  QRS axis: normal  Other findings: non-specific ST-T wave changes    Clinical impression: non-specific ECG          Assessment:       Diagnosis Plan   1. PAF (paroxysmal atrial fibrillation)        2. Status post ablation of atrial flutter        3. Essential " hypertension           Plan:       1/2.  Atrial Fibrillation and Atrial Flutter  Assessment   The patient has paroxysmal atrial fibrillation   This is non-valvular in etiology   The patient's CHADS2-VASc score is 3   A [id number] score of 2 or more is considered a high risk for a thromboembolic event   Rivaroxaban prescribed    Plan   Attempt to maintain sinus rhythm   Continue rivaroxaban for antithrombotic therapy, bleeding issues discussed   Continue beta blocker for rhythm control   Continue flecainide for rhythm control    Subjective - Objective   The average duration of atrial fibrillation episodes is <48 hours    Her episodes are steadily becoming more frequent and lasting longer. However, she is just not at the point where she feels that her QOL is affected badly enough to consider an ablation. I would like to hold off on escalating her antiarrhythmic therapy at this time, although we could consider stopping BB/1C and switching to sotalol.    3.  Her BP is well controlled on her current regimen.    Sincerely,       Dylan Stanton MD

## 2025-05-16 DIAGNOSIS — I48.0 PAF (PAROXYSMAL ATRIAL FIBRILLATION): ICD-10-CM

## 2025-05-16 RX ORDER — METOPROLOL TARTRATE 50 MG
TABLET ORAL
Qty: 90 TABLET | Refills: 0 | Status: SHIPPED | OUTPATIENT
Start: 2025-05-16

## 2025-05-16 NOTE — TELEPHONE ENCOUNTER
LAST REFILL - 11/19/24  LAST VISIT - 10/02/24  NEXT VISIT - not scheduled    Protocol met for metoprolol, refilled by MA for 30 day supply w/ 0 refills.. Please advise on Xarelto refill. Patient is overdue for appt per 10/2/24 recall for 1 month follow-up. 30 day supply pended w/ 0 refills.

## 2025-05-23 DIAGNOSIS — I48.0 PAF (PAROXYSMAL ATRIAL FIBRILLATION): ICD-10-CM

## 2025-06-16 DIAGNOSIS — I48.0 PAF (PAROXYSMAL ATRIAL FIBRILLATION): ICD-10-CM

## 2025-06-16 RX ORDER — RIVAROXABAN 20 MG/1
20 TABLET, FILM COATED ORAL DAILY
Qty: 30 TABLET | Refills: 1 | Status: SHIPPED | OUTPATIENT
Start: 2025-06-16

## 2025-06-16 RX ORDER — METOPROLOL TARTRATE 50 MG
TABLET ORAL
Qty: 90 TABLET | Refills: 0 | Status: SHIPPED | OUTPATIENT
Start: 2025-06-16

## 2025-06-18 ENCOUNTER — TELEPHONE (OUTPATIENT)
Dept: FAMILY MEDICINE CLINIC | Facility: CLINIC | Age: 70
End: 2025-06-18
Payer: MEDICARE

## 2025-06-18 NOTE — TELEPHONE ENCOUNTER
Patient is calling regarding her refills for her medication not being for 90 days. Metoprolol Tartrate. Please advise.

## 2025-06-18 NOTE — TELEPHONE ENCOUNTER
Returned patient's call. Patient questioned why her refills were reduced from 90 day supply to a 30 day supply. Informed patient that her recall from her last appointment was one month, and she was due for a follow-up in November to check the efficacy of her HTN medications. Patient questioned why she needs to follow up with Dr. Fosetr if her cardiologist has recently seen her and had no concerns. Requested information from patient if these medications are instead prescribed, or meant to be prescribed, by her cardiologist. Patient states that her cardiologist suggested her general practitioner refill these medications since he had made recent refills/changes to her metoprolol and Xarelto. Patient informed again that she will need to schedule an appointment to get back on a routine schedule of 90 day refills. Referenced dot429 message sent by patient in late May with the same concern as today's concern. Read to patient the reply from Dr. Foster requesting to see her more than once yearly. Relayed to patient Dr. Foster's typical recall for patients with hypertension as every 6 months. She verbalized an understanding, scheduled an appointment to see Dr. Foster. Refills will be sent to last patient until her appointment date, and then resume 90 day refills once she has been seen.

## 2025-07-02 ENCOUNTER — OFFICE VISIT (OUTPATIENT)
Dept: SLEEP MEDICINE | Facility: HOSPITAL | Age: 70
End: 2025-07-02
Payer: MEDICARE

## 2025-07-02 VITALS — HEIGHT: 65 IN | BODY MASS INDEX: 37.65 KG/M2 | OXYGEN SATURATION: 95 % | WEIGHT: 226 LBS | HEART RATE: 63 BPM

## 2025-07-02 DIAGNOSIS — T88.8XXA MALFUNCTION OF CONTINUOUS POSITIVE AIRWAY PRESSURE (CPAP) OR BILEVEL POSITIVE AIRWAY PRESSURE (BPAP) MACHINE, INITIAL ENCOUNTER: ICD-10-CM

## 2025-07-02 DIAGNOSIS — G47.33 OSA ON CPAP: Primary | ICD-10-CM

## 2025-07-02 DIAGNOSIS — E66.812 CLASS 2 SEVERE OBESITY DUE TO EXCESS CALORIES WITH SERIOUS COMORBIDITY AND BODY MASS INDEX (BMI) OF 36.0 TO 36.9 IN ADULT: ICD-10-CM

## 2025-07-02 DIAGNOSIS — E66.01 CLASS 2 SEVERE OBESITY DUE TO EXCESS CALORIES WITH SERIOUS COMORBIDITY AND BODY MASS INDEX (BMI) OF 36.0 TO 36.9 IN ADULT: ICD-10-CM

## 2025-07-02 PROCEDURE — G0463 HOSPITAL OUTPT CLINIC VISIT: HCPCS

## 2025-07-02 NOTE — PROGRESS NOTES
"Follow Up [facility] Disorders [facility] Note     Chief Complaint:  SCAR     Primary Care Physician: Rufnio Foster MD    Interval History:   The patient is a 70 y.o. female  who was last seen in the sleep lab: 7/2/2024.  Presents today for annual follow-up.  Baseline AHI 29.1.  On set pressure of 9 cm H2O.  Noticing voice changes when laying flat on her back and difficulty getting air out of difficulty singing loudly.  Machine showing that motor has exceeded exceeded its life.  Needs order for new machine.  Has some dry mouth where her mouth drops open during sleep; has a nasal mask.    Downloaded PAP Data Reviewed For Compliance:  DME is CPAP online.  Downloads between 6/2/2024 - 7/1/2024.  Average usage is 7 hours 22 minutes.  Average AHI is 0.9.  Average auto CPAP pressure is 9 cm H2O    I have reviewed the above results and compared them with the patient's last downloads and reviewed with the patient.    Review of Systems:    A complete review of systems was done and all were negative with the exception of the scanned media  Social History:    Social History     Socioeconomic History    Marital status:    Tobacco Use    Smoking status: Never     Passive exposure: Never    Smokeless tobacco: Never    Tobacco comments:     caffeine use / tea/ 6 CUPS DAILY    Vaping Use    Vaping status: Never Used   Substance and Sexual Activity    Alcohol use: Yes     Alcohol/week: 2.0 standard drinks of alcohol     Types: 1 Glasses of wine, 1 Shots of liquor per week     Comment: couple drinks a month    Drug use: No    Sexual activity: Defer       Allergies:  Aspartame, Erythromycin, Hydrocodone-acetaminophen, Morphine and codeine, and Oxycodone-acetaminophen     Medication Review:  Reviewed.      Vital Signs:    Vitals:    07/02/25 1142   Pulse: 63   SpO2: 95%   Weight: 103 kg (226 lb)   Height: 165.1 cm (65\")     Body mass index is 37.61 kg/m².    Physical Exam:    Constitutional:  Well developed 70 y.o. female that " appears in no apparent distress.  Awake & oriented times 3.  Normal mood with normal recent and remote memory and normal judgement.  Eyes:  Conjunctivae normal.  Oropharynx: Previously, moist mucous membranes.    Self-administered [place] Sleepiness Scale test results: See scanned media  0-5 Lower normal daytime sleepiness  6-10 Higher normal daytime sleepiness  11-12 Mild, 13-15 Moderate, & 16-24 Severe excessive daytime sleepiness    Impression:   1. SCAR on CPAP    2. Class 2 severe obesity due to excess calories with serious comorbidity and body mass index (BMI) of 36.0 to 36.9 in adult    3. Malfunction of continuous positive airway pressure (CPAP) or bilevel positive airway pressure (BPAP) machine, initial encounter        Obstructive sleep apnea adequately treated with CPAP 9 cm H2O. The patient appears to be at goal with good compliance and usage.  Advised to consider referral to ENT via primary care to discuss changes in her voice.  Also advised to consider chinstrap to help with dry mouth.  Machine broken beyond repair.  Order placed for new machine.  Orders for supplies online.    Plan:  Good sleep hygiene measures should be maintained.  Weight loss would be beneficial in this patient who is obese by Body mass index is 37.61 kg/m²..      After evaluating the patient and assessing results available, the patient is benefiting from the treatment being provided.     The patient will continue CPAP.  After clinical evaluation and review of downloads, I recommend no changes to the patient's pressures.  A new prescription will be sent to the patient's DME.    Caution during activities that require prolonged concentration is strongly advised if sleepiness returns. Changing of PAP supplies regularly is important for effective use. Patient needs to change cushion on the mask or plugs on nasal pillows along with disposable filters once every month and change mask frame, tubing, headgear and Velcro straps every 6  months at the minimum.    I answered all of the patient's questions.  The patient will call for any problems and will follow up in 1 year.      Al Sheffield MD  Sleep Medicine  07/02/25  12:47 EDT

## 2025-07-17 RX ORDER — METOPROLOL TARTRATE 50 MG
TABLET ORAL
Qty: 90 TABLET | Refills: 0 | Status: SHIPPED | OUTPATIENT
Start: 2025-07-17

## 2025-07-23 ENCOUNTER — OFFICE VISIT (OUTPATIENT)
Dept: FAMILY MEDICINE CLINIC | Facility: CLINIC | Age: 70
End: 2025-07-23
Payer: MEDICARE

## 2025-07-23 VITALS
DIASTOLIC BLOOD PRESSURE: 64 MMHG | SYSTOLIC BLOOD PRESSURE: 120 MMHG | HEIGHT: 65 IN | WEIGHT: 231.8 LBS | BODY MASS INDEX: 38.62 KG/M2 | HEART RATE: 69 BPM | RESPIRATION RATE: 16 BRPM | OXYGEN SATURATION: 97 %

## 2025-07-23 DIAGNOSIS — I48.0 PAF (PAROXYSMAL ATRIAL FIBRILLATION): ICD-10-CM

## 2025-07-23 DIAGNOSIS — G47.09 OTHER INSOMNIA: ICD-10-CM

## 2025-07-23 DIAGNOSIS — I10 ESSENTIAL HYPERTENSION: Primary | ICD-10-CM

## 2025-07-23 PROCEDURE — 3078F DIAST BP <80 MM HG: CPT | Performed by: FAMILY MEDICINE

## 2025-07-23 PROCEDURE — 1159F MED LIST DOCD IN RCRD: CPT | Performed by: FAMILY MEDICINE

## 2025-07-23 PROCEDURE — 3074F SYST BP LT 130 MM HG: CPT | Performed by: FAMILY MEDICINE

## 2025-07-23 PROCEDURE — 1160F RVW MEDS BY RX/DR IN RCRD: CPT | Performed by: FAMILY MEDICINE

## 2025-07-23 PROCEDURE — 99214 OFFICE O/P EST MOD 30 MIN: CPT | Performed by: FAMILY MEDICINE

## 2025-07-23 RX ORDER — METOPROLOL TARTRATE 50 MG
TABLET ORAL
Qty: 270 TABLET | Refills: 3 | Status: SHIPPED | OUTPATIENT
Start: 2025-07-23

## 2025-07-23 NOTE — PROGRESS NOTES
"Chief Complaint  Hypertension    Subjective    History of Present Illness  History of Present Illness  The patient presents for evaluation of blood pressure and arrhythmia.    She reports that her blood pressure has been inconsistent over the past 6 weeks, with no discernible pattern related to the time of day. She monitors her blood pressure both in the morning and evening but has not identified any specific trends. She is currently on lisinopril, hydrochlorothiazide, and metoprolol.    She continues to take flecainide but does not believe it is effective. She uses Ambien intermittently, primarily when experiencing arrhythmia at night, as flecainide does not seem to alleviate her symptoms. Her cardiologist initially prescribed flecainide 3 times a day as needed, but later adjusted the dosage to twice daily. She informed Dr. Stanton that this regimen was ineffective but did not discuss the possibility of increasing the dosage.      Objective     Vital Signs:   /64   Pulse 69   Resp 16   Ht 165.1 cm (65\")   Wt 105 kg (231 lb 12.8 oz)   SpO2 97%   BMI 38.57 kg/m²   Physical Exam  Vitals and nursing note reviewed.   Constitutional:       General: She is not in acute distress.     Appearance: Normal appearance. She is not ill-appearing.   Cardiovascular:      Rate and Rhythm: Normal rate and regular rhythm.      Pulses: Normal pulses.      Heart sounds: Normal heart sounds. No murmur heard.  Pulmonary:      Effort: Pulmonary effort is normal. No respiratory distress.      Breath sounds: Normal breath sounds. No rales.   Neurological:      Mental Status: She is alert and oriented to person, place, and time. Mental status is at baseline.   Psychiatric:         Mood and Affect: Mood normal.         Behavior: Behavior normal.            Assessment and Plan   Diagnoses and all orders for this visit:    1. Essential hypertension (Primary)  -     metoprolol tartrate (LOPRESSOR) 50 MG tablet; TAKE 1 TABLET BY MOUTH IN " THE MORNING AND 2 TABLETS IN THE EVENING  Dispense: 270 tablet; Refill: 3    2. PAF (paroxysmal atrial fibrillation)  -     metoprolol tartrate (LOPRESSOR) 50 MG tablet; TAKE 1 TABLET BY MOUTH IN THE MORNING AND 2 TABLETS IN THE EVENING  Dispense: 270 tablet; Refill: 3  -     rivaroxaban (Xarelto) 20 MG tablet; Take 1 tablet by mouth Daily.  Dispense: 90 tablet; Refill: 3    3. Other insomnia      Assessment & Plan  1. Hypertension.  - 90-day refill for metoprolol will be provided.  - Continue regimen as prescribed.  - Continue to monitor blood pressures and keep me updated.    2. Paroxysmal atrial fibrillation.  - Patient advised to consult with cardiologist about increasing flecainide dosage.    3. Insomnia.  - Refill for Ambien will be provided.  - CSA updated today.    Recommended follow up as below. Encouraged communication via [employer] in the meantime.     Patient was given instructions and counseling regarding her condition or for health maintenance advice. Please see specific information pulled into the AVS (placed there by myself) if appropriate.    Return in about 3 months (around 10/23/2025), or if symptoms worsen or fail to improve, for Medicare Wellness.    Patient or patient representative verbalized consent for the use of Ambient Listening during the visit with  Rufino Foster MD for chart documentation. 7/23/2025  14:04 DENG Foster MD

## (undated) DEVICE — TIDISHIELD UROLOGY DRAIN BAGS FROSTY VINYL STERILE FITS SIEMENS UROSKOP ACCESS 20 PER CASE: Brand: TIDISHIELD

## (undated) DEVICE — HF-RESECTION ELECTRODE PLASMALOOP LOOP, MEDIUM, 24 FR., 12°-30°, ESG TURIS: Brand: OLYMPUS

## (undated) DEVICE — TUBING, SUCTION, 1/4" X 20', STRAIGHT: Brand: MEDLINE INDUSTRIES, INC.

## (undated) DEVICE — LOU TUR: Brand: MEDLINE INDUSTRIES, INC.

## (undated) DEVICE — GLV SURG BIOGEL LTX PF 7